# Patient Record
Sex: FEMALE | Race: WHITE | Employment: OTHER | URBAN - METROPOLITAN AREA
[De-identification: names, ages, dates, MRNs, and addresses within clinical notes are randomized per-mention and may not be internally consistent; named-entity substitution may affect disease eponyms.]

---

## 2023-11-12 ENCOUNTER — HOSPITAL ENCOUNTER (INPATIENT)
Age: 83
LOS: 4 days | Discharge: HOME | DRG: 512 | End: 2023-11-16
Attending: STUDENT IN AN ORGANIZED HEALTH CARE EDUCATION/TRAINING PROGRAM | Admitting: INTERNAL MEDICINE
Payer: MEDICARE

## 2023-11-12 ENCOUNTER — APPOINTMENT (OUTPATIENT)
Dept: GENERAL RADIOLOGY | Age: 83
DRG: 512 | End: 2023-11-12
Payer: MEDICARE

## 2023-11-12 ENCOUNTER — APPOINTMENT (OUTPATIENT)
Dept: CT IMAGING | Age: 83
DRG: 512 | End: 2023-11-12
Payer: MEDICARE

## 2023-11-12 DIAGNOSIS — S52.91XA CLOSED FRACTURE OF RIGHT RADIUS AND ULNA, INITIAL ENCOUNTER: Primary | ICD-10-CM

## 2023-11-12 DIAGNOSIS — S52.201A CLOSED FRACTURE OF RIGHT RADIUS AND ULNA, INITIAL ENCOUNTER: Primary | ICD-10-CM

## 2023-11-12 PROBLEM — S52.91XP: Status: ACTIVE | Noted: 2023-11-12

## 2023-11-12 PROBLEM — S52.201P: Status: ACTIVE | Noted: 2023-11-12

## 2023-11-12 LAB
ABO + RH BLD: NORMAL
ALBUMIN SERPL-MCNC: 4.4 G/DL (ref 3.5–5.2)
ALP SERPL-CCNC: 82 U/L (ref 35–104)
ALT SERPL-CCNC: 18 U/L (ref 0–32)
ANION GAP SERPL CALCULATED.3IONS-SCNC: 13 MMOL/L (ref 7–16)
ARM BAND NUMBER: NORMAL
AST SERPL-CCNC: 33 U/L (ref 0–31)
BILIRUB SERPL-MCNC: 0.4 MG/DL (ref 0–1.2)
BLOOD BANK SAMPLE EXPIRATION: NORMAL
BLOOD GROUP ANTIBODIES SERPL: NEGATIVE
BUN SERPL-MCNC: 13 MG/DL (ref 6–23)
CALCIUM SERPL-MCNC: 9.2 MG/DL (ref 8.6–10.2)
CHLORIDE SERPL-SCNC: 98 MMOL/L (ref 98–107)
CO2 SERPL-SCNC: 26 MMOL/L (ref 22–29)
CREAT SERPL-MCNC: 0.6 MG/DL (ref 0.5–1)
ERYTHROCYTE [DISTWIDTH] IN BLOOD BY AUTOMATED COUNT: 14.5 % (ref 11.5–15)
GFR SERPL CREATININE-BSD FRML MDRD: >60 ML/MIN/1.73M2
GLUCOSE SERPL-MCNC: 45 MG/DL (ref 74–99)
HCT VFR BLD AUTO: 37.2 % (ref 34–48)
HGB BLD-MCNC: 11.7 G/DL (ref 11.5–15.5)
MCH RBC QN AUTO: 27.3 PG (ref 26–35)
MCHC RBC AUTO-ENTMCNC: 31.5 G/DL (ref 32–34.5)
MCV RBC AUTO: 86.7 FL (ref 80–99.9)
PLATELET # BLD AUTO: 184 K/UL (ref 130–450)
PMV BLD AUTO: 10.6 FL (ref 7–12)
POTASSIUM SERPL-SCNC: 4.8 MMOL/L (ref 3.5–5)
PROT SERPL-MCNC: 7.4 G/DL (ref 6.4–8.3)
RBC # BLD AUTO: 4.29 M/UL (ref 3.5–5.5)
SODIUM SERPL-SCNC: 137 MMOL/L (ref 132–146)
WBC OTHER # BLD: 10.6 K/UL (ref 4.5–11.5)

## 2023-11-12 PROCEDURE — 1200000000 HC SEMI PRIVATE

## 2023-11-12 PROCEDURE — 6360000002 HC RX W HCPCS: Performed by: FAMILY MEDICINE

## 2023-11-12 PROCEDURE — 6370000000 HC RX 637 (ALT 250 FOR IP)

## 2023-11-12 PROCEDURE — 99222 1ST HOSP IP/OBS MODERATE 55: CPT | Performed by: ORTHOPAEDIC SURGERY

## 2023-11-12 PROCEDURE — 96374 THER/PROPH/DIAG INJ IV PUSH: CPT

## 2023-11-12 PROCEDURE — 6360000002 HC RX W HCPCS

## 2023-11-12 PROCEDURE — 86901 BLOOD TYPING SEROLOGIC RH(D): CPT

## 2023-11-12 PROCEDURE — 85027 COMPLETE CBC AUTOMATED: CPT

## 2023-11-12 PROCEDURE — 99285 EMERGENCY DEPT VISIT HI MDM: CPT

## 2023-11-12 PROCEDURE — 86900 BLOOD TYPING SEROLOGIC ABO: CPT

## 2023-11-12 PROCEDURE — 6360000002 HC RX W HCPCS: Performed by: INTERNAL MEDICINE

## 2023-11-12 PROCEDURE — 73090 X-RAY EXAM OF FOREARM: CPT

## 2023-11-12 PROCEDURE — 73200 CT UPPER EXTREMITY W/O DYE: CPT

## 2023-11-12 PROCEDURE — 94640 AIRWAY INHALATION TREATMENT: CPT

## 2023-11-12 PROCEDURE — 80053 COMPREHEN METABOLIC PANEL: CPT

## 2023-11-12 PROCEDURE — 70450 CT HEAD/BRAIN W/O DYE: CPT

## 2023-11-12 PROCEDURE — 73100 X-RAY EXAM OF WRIST: CPT

## 2023-11-12 PROCEDURE — 73070 X-RAY EXAM OF ELBOW: CPT

## 2023-11-12 PROCEDURE — 86850 RBC ANTIBODY SCREEN: CPT

## 2023-11-12 PROCEDURE — 36415 COLL VENOUS BLD VENIPUNCTURE: CPT

## 2023-11-12 PROCEDURE — 73110 X-RAY EXAM OF WRIST: CPT

## 2023-11-12 PROCEDURE — 2580000003 HC RX 258: Performed by: FAMILY MEDICINE

## 2023-11-12 PROCEDURE — 72125 CT NECK SPINE W/O DYE: CPT

## 2023-11-12 PROCEDURE — 96376 TX/PRO/DX INJ SAME DRUG ADON: CPT

## 2023-11-12 RX ORDER — ALLOPURINOL 100 MG/1
100 TABLET ORAL DAILY
COMMUNITY

## 2023-11-12 RX ORDER — ONDANSETRON 2 MG/ML
4 INJECTION INTRAMUSCULAR; INTRAVENOUS EVERY 6 HOURS PRN
Status: DISCONTINUED | OUTPATIENT
Start: 2023-11-12 | End: 2023-11-16 | Stop reason: HOSPADM

## 2023-11-12 RX ORDER — POTASSIUM CHLORIDE 7.45 MG/ML
10 INJECTION INTRAVENOUS PRN
Status: DISCONTINUED | OUTPATIENT
Start: 2023-11-12 | End: 2023-11-16 | Stop reason: HOSPADM

## 2023-11-12 RX ORDER — ONDANSETRON 4 MG/1
4 TABLET, ORALLY DISINTEGRATING ORAL EVERY 8 HOURS PRN
Status: DISCONTINUED | OUTPATIENT
Start: 2023-11-12 | End: 2023-11-16 | Stop reason: HOSPADM

## 2023-11-12 RX ORDER — FLUTICASONE PROPIONATE 110 UG/1
2 AEROSOL, METERED RESPIRATORY (INHALATION) 2 TIMES DAILY
Status: DISCONTINUED | OUTPATIENT
Start: 2023-11-12 | End: 2023-11-12 | Stop reason: CLARIF

## 2023-11-12 RX ORDER — OXYCODONE HYDROCHLORIDE 5 MG/1
5 TABLET ORAL EVERY 4 HOURS PRN
Status: DISCONTINUED | OUTPATIENT
Start: 2023-11-12 | End: 2023-11-16 | Stop reason: HOSPADM

## 2023-11-12 RX ORDER — ACETAMINOPHEN 325 MG/1
650 TABLET ORAL EVERY 6 HOURS PRN
Status: DISCONTINUED | OUTPATIENT
Start: 2023-11-12 | End: 2023-11-12 | Stop reason: ALTCHOICE

## 2023-11-12 RX ORDER — ACETAMINOPHEN 325 MG/1
650 TABLET ORAL EVERY 6 HOURS PRN
COMMUNITY

## 2023-11-12 RX ORDER — ASPIRIN 81 MG/1
81 TABLET ORAL DAILY
Status: ON HOLD | COMMUNITY
End: 2023-11-15 | Stop reason: HOSPADM

## 2023-11-12 RX ORDER — CITALOPRAM 20 MG/1
20 TABLET ORAL DAILY
Status: DISCONTINUED | OUTPATIENT
Start: 2023-11-12 | End: 2023-11-16 | Stop reason: HOSPADM

## 2023-11-12 RX ORDER — FLUTICASONE PROPIONATE 110 UG/1
2 AEROSOL, METERED RESPIRATORY (INHALATION) 2 TIMES DAILY
COMMUNITY

## 2023-11-12 RX ORDER — ALBUTEROL SULFATE 90 UG/1
2 AEROSOL, METERED RESPIRATORY (INHALATION) EVERY 6 HOURS PRN
COMMUNITY

## 2023-11-12 RX ORDER — PANTOPRAZOLE SODIUM 40 MG/1
40 TABLET, DELAYED RELEASE ORAL DAILY
COMMUNITY

## 2023-11-12 RX ORDER — ALBUTEROL SULFATE 2.5 MG/3ML
2.5 SOLUTION RESPIRATORY (INHALATION) EVERY 6 HOURS PRN
Status: DISCONTINUED | OUTPATIENT
Start: 2023-11-12 | End: 2023-11-16 | Stop reason: HOSPADM

## 2023-11-12 RX ORDER — MORPHINE SULFATE 2 MG/ML
2 INJECTION, SOLUTION INTRAMUSCULAR; INTRAVENOUS EVERY 4 HOURS PRN
Status: DISCONTINUED | OUTPATIENT
Start: 2023-11-12 | End: 2023-11-16 | Stop reason: HOSPADM

## 2023-11-12 RX ORDER — ACETAMINOPHEN 325 MG/1
650 TABLET ORAL EVERY 4 HOURS PRN
Status: DISCONTINUED | OUTPATIENT
Start: 2023-11-12 | End: 2023-11-16 | Stop reason: SDUPTHER

## 2023-11-12 RX ORDER — FENTANYL CITRATE 50 UG/ML
50 INJECTION, SOLUTION INTRAMUSCULAR; INTRAVENOUS ONCE
Status: COMPLETED | OUTPATIENT
Start: 2023-11-12 | End: 2023-11-12

## 2023-11-12 RX ORDER — SODIUM CHLORIDE 9 MG/ML
INJECTION, SOLUTION INTRAVENOUS PRN
Status: DISCONTINUED | OUTPATIENT
Start: 2023-11-12 | End: 2023-11-16 | Stop reason: HOSPADM

## 2023-11-12 RX ORDER — PRAVASTATIN SODIUM 20 MG
40 TABLET ORAL DAILY
Status: DISCONTINUED | OUTPATIENT
Start: 2023-11-12 | End: 2023-11-16 | Stop reason: HOSPADM

## 2023-11-12 RX ORDER — SODIUM CHLORIDE 0.9 % (FLUSH) 0.9 %
5-40 SYRINGE (ML) INJECTION EVERY 12 HOURS SCHEDULED
Status: DISCONTINUED | OUTPATIENT
Start: 2023-11-12 | End: 2023-11-16 | Stop reason: HOSPADM

## 2023-11-12 RX ORDER — POTASSIUM CHLORIDE 20 MEQ/1
40 TABLET, EXTENDED RELEASE ORAL PRN
Status: DISCONTINUED | OUTPATIENT
Start: 2023-11-12 | End: 2023-11-16 | Stop reason: HOSPADM

## 2023-11-12 RX ORDER — ACETAMINOPHEN 325 MG/1
650 TABLET ORAL EVERY 6 HOURS PRN
Status: DISCONTINUED | OUTPATIENT
Start: 2023-11-12 | End: 2023-11-16 | Stop reason: HOSPADM

## 2023-11-12 RX ORDER — PRAVASTATIN SODIUM 40 MG
40 TABLET ORAL DAILY
COMMUNITY

## 2023-11-12 RX ORDER — METOPROLOL TARTRATE 50 MG/1
25 TABLET, FILM COATED ORAL 2 TIMES DAILY
COMMUNITY

## 2023-11-12 RX ORDER — ALBUTEROL SULFATE 90 UG/1
2 AEROSOL, METERED RESPIRATORY (INHALATION) EVERY 6 HOURS PRN
Status: DISCONTINUED | OUTPATIENT
Start: 2023-11-12 | End: 2023-11-12 | Stop reason: CLARIF

## 2023-11-12 RX ORDER — POLYETHYLENE GLYCOL 3350 17 G/17G
17 POWDER, FOR SOLUTION ORAL DAILY PRN
Status: DISCONTINUED | OUTPATIENT
Start: 2023-11-12 | End: 2023-11-16 | Stop reason: HOSPADM

## 2023-11-12 RX ORDER — SODIUM CHLORIDE 0.9 % (FLUSH) 0.9 %
10 SYRINGE (ML) INJECTION PRN
Status: DISCONTINUED | OUTPATIENT
Start: 2023-11-12 | End: 2023-11-16 | Stop reason: HOSPADM

## 2023-11-12 RX ORDER — ASPIRIN 81 MG/1
81 TABLET ORAL DAILY
Status: DISCONTINUED | OUTPATIENT
Start: 2023-11-12 | End: 2023-11-15 | Stop reason: ALTCHOICE

## 2023-11-12 RX ORDER — BUDESONIDE 0.5 MG/2ML
0.5 INHALANT ORAL
Status: DISCONTINUED | OUTPATIENT
Start: 2023-11-12 | End: 2023-11-16 | Stop reason: HOSPADM

## 2023-11-12 RX ORDER — OXYCODONE HYDROCHLORIDE 10 MG/1
10 TABLET ORAL EVERY 4 HOURS PRN
Status: DISCONTINUED | OUTPATIENT
Start: 2023-11-12 | End: 2023-11-16 | Stop reason: HOSPADM

## 2023-11-12 RX ORDER — CITALOPRAM 20 MG/1
20 TABLET ORAL DAILY
COMMUNITY

## 2023-11-12 RX ORDER — ACETAMINOPHEN 650 MG/1
650 SUPPOSITORY RECTAL EVERY 6 HOURS PRN
Status: DISCONTINUED | OUTPATIENT
Start: 2023-11-12 | End: 2023-11-16 | Stop reason: HOSPADM

## 2023-11-12 RX ORDER — ALLOPURINOL 100 MG/1
100 TABLET ORAL DAILY
Status: DISCONTINUED | OUTPATIENT
Start: 2023-11-12 | End: 2023-11-16 | Stop reason: HOSPADM

## 2023-11-12 RX ORDER — PANTOPRAZOLE SODIUM 40 MG/1
40 TABLET, DELAYED RELEASE ORAL DAILY
Status: DISCONTINUED | OUTPATIENT
Start: 2023-11-12 | End: 2023-11-16 | Stop reason: HOSPADM

## 2023-11-12 RX ORDER — MAGNESIUM SULFATE IN WATER 40 MG/ML
2000 INJECTION, SOLUTION INTRAVENOUS PRN
Status: DISCONTINUED | OUTPATIENT
Start: 2023-11-12 | End: 2023-11-16 | Stop reason: HOSPADM

## 2023-11-12 RX ORDER — SODIUM CHLORIDE 9 MG/ML
INJECTION, SOLUTION INTRAVENOUS CONTINUOUS
Status: DISCONTINUED | OUTPATIENT
Start: 2023-11-12 | End: 2023-11-16

## 2023-11-12 RX ADMIN — BUDESONIDE 500 MCG: 0.5 INHALANT RESPIRATORY (INHALATION) at 21:30

## 2023-11-12 RX ADMIN — MORPHINE SULFATE 2 MG: 2 INJECTION, SOLUTION INTRAMUSCULAR; INTRAVENOUS at 21:24

## 2023-11-12 RX ADMIN — FENTANYL CITRATE 50 MCG: 50 INJECTION INTRAMUSCULAR; INTRAVENOUS at 16:22

## 2023-11-12 RX ADMIN — Medication 10 ML: at 21:24

## 2023-11-12 RX ADMIN — OXYCODONE HYDROCHLORIDE 10 MG: 10 TABLET ORAL at 23:26

## 2023-11-12 RX ADMIN — FENTANYL CITRATE 50 MCG: 50 INJECTION INTRAMUSCULAR; INTRAVENOUS at 14:12

## 2023-11-12 ASSESSMENT — LIFESTYLE VARIABLES
HOW OFTEN DO YOU HAVE A DRINK CONTAINING ALCOHOL: NEVER
HOW MANY STANDARD DRINKS CONTAINING ALCOHOL DO YOU HAVE ON A TYPICAL DAY: PATIENT DOES NOT DRINK
HOW OFTEN DO YOU HAVE A DRINK CONTAINING ALCOHOL: NEVER

## 2023-11-12 ASSESSMENT — PAIN DESCRIPTION - DESCRIPTORS
DESCRIPTORS: ACHING

## 2023-11-12 ASSESSMENT — PAIN SCALES - GENERAL
PAINLEVEL_OUTOF10: 8
PAINLEVEL_OUTOF10: 4
PAINLEVEL_OUTOF10: 9
PAINLEVEL_OUTOF10: 10
PAINLEVEL_OUTOF10: 5
PAINLEVEL_OUTOF10: 7

## 2023-11-12 ASSESSMENT — PAIN DESCRIPTION - ORIENTATION
ORIENTATION: RIGHT

## 2023-11-12 ASSESSMENT — PAIN DESCRIPTION - LOCATION
LOCATION: ARM

## 2023-11-12 ASSESSMENT — PAIN DESCRIPTION - PAIN TYPE
TYPE: ACUTE PAIN
TYPE: ACUTE PAIN

## 2023-11-12 ASSESSMENT — PAIN DESCRIPTION - ONSET
ONSET: ON-GOING
ONSET: ON-GOING

## 2023-11-12 ASSESSMENT — PAIN DESCRIPTION - FREQUENCY
FREQUENCY: CONTINUOUS
FREQUENCY: CONTINUOUS

## 2023-11-12 ASSESSMENT — PAIN - FUNCTIONAL ASSESSMENT
PAIN_FUNCTIONAL_ASSESSMENT: 0-10
PAIN_FUNCTIONAL_ASSESSMENT: 0-10

## 2023-11-12 NOTE — CONSULTS
Department of Orthopedic Surgery  Resident Consult Note          Reason for Consult:   Right Wrist Pain    HISTORY OF PRESENT ILLNESS:       Patient is a 80 y.o. female who presents with wrist pain after fall. Patient report a fall while walking to her car from [employer]. No head trauma or LOC. Cause of a fall: trip over foot. Patient is right hand dominant. Anticoagulation - none. The patient is currently unemployed. The patient is community Ambulator with assist  - walker. Patient states that she was just recently prescribed a walker a few months ago after a fall from standing height resulting in spraining both of her ankles. Pt lives at home . Patient has a significant history of valvular heart disease, HL, CHF and HTN. ORIF R tibia performed over 2 decades ago. Previous Orthopedic Surgeon - no Denies numbness/tingling/paresthesias. Denies any other orthopedic complaints at this time. Tobacco use: denies  Alcohol use: none  Illicit drug use: no history of illicit drug use    Past Medical History:    No past medical history on file. Past Surgical History:    No past surgical history on file. Current Medications:   No current facility-administered medications for this encounter. Allergies:  Patient has no allergy information on record. Social History:   TOBACCO:   has no history on file for tobacco use. ETOH:   has no history on file for alcohol use. DRUGS:   has no history on file for drug use. ACTIVITIES OF DAILY LIVING:    OCCUPATION:    Family History:   No family history on file.     REVIEW OF SYSTEMS:  CONSTITUTIONAL:  negative for  fevers, chills  EYES:  negative for blurred vision, visual disturbance  HEENT:  negative for  hearing loss, voice change  RESPIRATORY:  negative for  dyspnea, wheezing  CARDIOVASCULAR:  negative for  chest pain, palpitations  GASTROINTESTINAL:  negative for nausea, vomiting  GENITOURINARY:  negative for frequency, urinary incontinence  HEMATOLOGIC/LYMPHATIC:  negative the neck. Segmented fracture of the proximal one third ulna with no extension into the ulnohumeral joint. Significant comminution about the volar aspect of the distal radius with shearing component and impaction. Separate dorsal ulnar fracture component. IMPRESSION:  Closed, Right intraarticular Distal Radius Fx  Closed, Right proximal 1/3rd ulnar shaft fx   Closed, Right radial head and neck fracture    Discussion:  Risk, benefits and treatment options were discussed and has verbalized understanding of options. The possibility of complications were also discussed to include but not limited to nerve damage, infection, problems with wound healing, vascular injury, chronic pain, stiffness, dysfunction, nonhealing of the bone, symptomatic hardware and/or its failure, need for subsequent surgery, dislocation, and blood clots as well as medical related problems and other problems not specifically discussed. Risk of anesthesia also discussed to include death. After all questions and concerns were address, she agreed to proceed with the procedure. PLAN:  After informed consent was verbally obtained. Distal radius underwent closed reduction with application of well-padded sugar tong splint, neurovascular status was unchanged  Dedicated elbow pre-reduction radiographs ordered. Post reduction films as well as post reduction both bone CT scan  Non-weight bearing to Right Upper Extermity. Maintain sling when upright or while ambulating. Pain medication: Multimodal  Ice and elevation to  rightUE  Hold anticoagulation prior to surgery. This will be held midnight on the day of surgery. Preoperative imaging and orders/labs have been made. Treatment consent  Plan for operative fixation pending on OR availability. Discussed with Dr. Natacha Ward Attending    I have seen and evaluated the patient and agree with the above assessment.  I have performed the key components of the history and

## 2023-11-12 NOTE — ED PROVIDER NOTES
Department of Emergency Medicine     Written by: Sylvia Morrissey DO  Patient Name: [facility] Date: 2023  1:11 PM  MRN: [id number]                   : 1940      HPI  Chief Complaint   Patient presents with    Fall     Fall with right wrist deformity. Denies hitting head/denies LOC     This is an 57-year-old female with no relevant past medical history on file who presents to the emergency complaining of right arm pain. She states that she was at [employer] and was walking out to her car to put something in the car when she lost her balance and fell. She landed on her right arm. Denies hitting her head and denies being on any blood thinners. She endorses pain to the right wrist and arm. Patient states she has seen orthopedic surgeon in the past however was about 20 years ago and in [place]. Patient denies any lightheadedness or dizziness, fever or chills, nausea or vomiting, chest pain, shortness of breath, abdominal pain, hematuria or dysuria, constipation or diarrhea. Nursing notes were all reviewed and agreed with or any disagreements were addressed in the HPI. Review of systems:    Pertinent positives and negatives mentioned in the HPI/MDM. Physical Exam  Constitutional:       General: She is not in acute distress. HENT:      Head: Normocephalic and atraumatic. Eyes:      Extraocular Movements: Extraocular movements intact. Pupils: Pupils are equal, round, and reactive to light. Cardiovascular:      Rate and Rhythm: Normal rate and regular rhythm. Pulmonary:      Effort: Pulmonary effort is normal.      Breath sounds: Normal breath sounds. No stridor. No wheezing, rhonchi or rales. Abdominal:      General: Abdomen is flat. There is no distension. Palpations: Abdomen is soft. Tenderness: There is no guarding. Musculoskeletal:         General: Signs of injury present. No deformity. Normal range of motion. Cervical back: Normal range of motion.
Name band;

## 2023-11-13 ENCOUNTER — APPOINTMENT (OUTPATIENT)
Dept: GENERAL RADIOLOGY | Age: 83
DRG: 512 | End: 2023-11-13
Payer: MEDICARE

## 2023-11-13 PROCEDURE — 6370000000 HC RX 637 (ALT 250 FOR IP): Performed by: FAMILY MEDICINE

## 2023-11-13 PROCEDURE — 6370000000 HC RX 637 (ALT 250 FOR IP)

## 2023-11-13 PROCEDURE — 97165 OT EVAL LOW COMPLEX 30 MIN: CPT

## 2023-11-13 PROCEDURE — 97161 PT EVAL LOW COMPLEX 20 MIN: CPT

## 2023-11-13 PROCEDURE — 94640 AIRWAY INHALATION TREATMENT: CPT

## 2023-11-13 PROCEDURE — 71045 X-RAY EXAM CHEST 1 VIEW: CPT

## 2023-11-13 PROCEDURE — 2700000000 HC OXYGEN THERAPY PER DAY

## 2023-11-13 PROCEDURE — 6360000002 HC RX W HCPCS: Performed by: FAMILY MEDICINE

## 2023-11-13 PROCEDURE — 1200000000 HC SEMI PRIVATE

## 2023-11-13 PROCEDURE — 97530 THERAPEUTIC ACTIVITIES: CPT

## 2023-11-13 PROCEDURE — 93005 ELECTROCARDIOGRAM TRACING: CPT | Performed by: STUDENT IN AN ORGANIZED HEALTH CARE EDUCATION/TRAINING PROGRAM

## 2023-11-13 PROCEDURE — 94669 MECHANICAL CHEST WALL OSCILL: CPT

## 2023-11-13 PROCEDURE — 6360000002 HC RX W HCPCS: Performed by: INTERNAL MEDICINE

## 2023-11-13 PROCEDURE — 2580000003 HC RX 258: Performed by: FAMILY MEDICINE

## 2023-11-13 RX ADMIN — ALBUTEROL SULFATE 2.5 MG: 2.5 SOLUTION RESPIRATORY (INHALATION) at 21:26

## 2023-11-13 RX ADMIN — MORPHINE SULFATE 2 MG: 2 INJECTION, SOLUTION INTRAMUSCULAR; INTRAVENOUS at 02:25

## 2023-11-13 RX ADMIN — BUDESONIDE 500 MCG: 0.5 INHALANT RESPIRATORY (INHALATION) at 09:13

## 2023-11-13 RX ADMIN — ALLOPURINOL 100 MG: 100 TABLET ORAL at 08:20

## 2023-11-13 RX ADMIN — PANTOPRAZOLE SODIUM 40 MG: 40 TABLET, DELAYED RELEASE ORAL at 08:20

## 2023-11-13 RX ADMIN — CITALOPRAM HYDROBROMIDE 20 MG: 20 TABLET ORAL at 08:20

## 2023-11-13 RX ADMIN — Medication 10 ML: at 19:46

## 2023-11-13 RX ADMIN — ALBUTEROL SULFATE 2.5 MG: 2.5 SOLUTION RESPIRATORY (INHALATION) at 09:13

## 2023-11-13 RX ADMIN — MORPHINE SULFATE 2 MG: 2 INJECTION, SOLUTION INTRAMUSCULAR; INTRAVENOUS at 08:24

## 2023-11-13 RX ADMIN — MORPHINE SULFATE 2 MG: 2 INJECTION, SOLUTION INTRAMUSCULAR; INTRAVENOUS at 12:55

## 2023-11-13 RX ADMIN — OXYCODONE HYDROCHLORIDE 10 MG: 10 TABLET ORAL at 10:29

## 2023-11-13 RX ADMIN — PRAVASTATIN SODIUM 40 MG: 20 TABLET ORAL at 08:20

## 2023-11-13 RX ADMIN — MORPHINE SULFATE 2 MG: 2 INJECTION, SOLUTION INTRAMUSCULAR; INTRAVENOUS at 16:48

## 2023-11-13 RX ADMIN — BUDESONIDE 500 MCG: 0.5 INHALANT RESPIRATORY (INHALATION) at 21:26

## 2023-11-13 RX ADMIN — OXYCODONE HYDROCHLORIDE 10 MG: 10 TABLET ORAL at 19:10

## 2023-11-13 RX ADMIN — OXYCODONE HYDROCHLORIDE 10 MG: 10 TABLET ORAL at 05:18

## 2023-11-13 RX ADMIN — OXYCODONE HYDROCHLORIDE 10 MG: 10 TABLET ORAL at 14:41

## 2023-11-13 RX ADMIN — METOPROLOL TARTRATE 25 MG: 25 TABLET, FILM COATED ORAL at 19:46

## 2023-11-13 RX ADMIN — METOPROLOL TARTRATE 25 MG: 25 TABLET, FILM COATED ORAL at 08:20

## 2023-11-13 ASSESSMENT — PAIN SCALES - GENERAL
PAINLEVEL_OUTOF10: 9
PAINLEVEL_OUTOF10: 9
PAINLEVEL_OUTOF10: 10
PAINLEVEL_OUTOF10: 4
PAINLEVEL_OUTOF10: 9
PAINLEVEL_OUTOF10: 10
PAINLEVEL_OUTOF10: 9
PAINLEVEL_OUTOF10: 9
PAINLEVEL_OUTOF10: 3
PAINLEVEL_OUTOF10: 4
PAINLEVEL_OUTOF10: 8

## 2023-11-13 ASSESSMENT — PAIN DESCRIPTION - LOCATION
LOCATION: HAND
LOCATION: ARM
LOCATION: HAND
LOCATION: ARM
LOCATION: WRIST
LOCATION: ARM
LOCATION: ARM

## 2023-11-13 ASSESSMENT — PAIN DESCRIPTION - ORIENTATION
ORIENTATION: RIGHT

## 2023-11-13 ASSESSMENT — PAIN DESCRIPTION - DESCRIPTORS
DESCRIPTORS: DISCOMFORT;THROBBING;TENDER
DESCRIPTORS: ACHING;DISCOMFORT;SHARP;THROBBING
DESCRIPTORS: ACHING;DISCOMFORT;SHARP;THROBBING
DESCRIPTORS: ACHING
DESCRIPTORS: ACHING
DESCRIPTORS: DISCOMFORT;THROBBING;TIGHTNESS
DESCRIPTORS: ACHING;DISCOMFORT;THROBBING

## 2023-11-13 ASSESSMENT — PAIN DESCRIPTION - ONSET
ONSET: ON-GOING

## 2023-11-13 ASSESSMENT — PAIN - FUNCTIONAL ASSESSMENT
PAIN_FUNCTIONAL_ASSESSMENT: ACTIVITIES ARE NOT PREVENTED
PAIN_FUNCTIONAL_ASSESSMENT: ACTIVITIES ARE NOT PREVENTED

## 2023-11-13 ASSESSMENT — PAIN DESCRIPTION - PAIN TYPE
TYPE: ACUTE PAIN

## 2023-11-13 ASSESSMENT — PAIN DESCRIPTION - FREQUENCY
FREQUENCY: CONTINUOUS

## 2023-11-13 ASSESSMENT — PAIN SCALES - WONG BAKER: WONGBAKER_NUMERICALRESPONSE: 0

## 2023-11-13 NOTE — H&P
[facility]  History and Physical      CHIEF COMPLAINT:    Chief Complaint   Patient presents with    Fall     Fall with right wrist deformity. Denies hitting head/denies LOC        Patient of Ginny Petersen., DO presents with:  Radius/ulna fracture, right, closed, with malunion, subsequent encounter    History of Present Illness:   Patient is an 22-year-old female without a past medical history on file. Patient presented to the ED with complaints of pain in her right wrist after a mechanical fall. Patient states that she was at [employer] and was walking out to her car to put something in the car she lost her balance and fell. Patient landed on her right arm. Patient denies hitting her head and denies being on any anticoagulation. Patient endorses pain to the right wrist DNR. Patient states she has seen an orthopedic surgeon in the past however that was about 20 years ago and it was in another city. Patient states she tripped she did not lose consciousness or have a syncopal episode. ER work-up revealed fracture of the distal right radius and comminuted fracture of the proximal.  Orthopedic surgery was consulted wrist was reduced in the ED and patient is admitted to [address] unit for further treatment. On evaluation she complains of severe pain in her right arm, starting to work with physical therapy. She has received pain medication without relief. She denies any syncopal episode to sustaining her fall. REVIEW OF SYSTEMS:  Pertinent negatives are above in HPI. 10 point ROS otherwise negative. History reviewed. No pertinent past medical history. History reviewed. No pertinent surgical history.     Medications Prior to Admission:    Medications Prior to Admission: allopurinol (ZYLOPRIM) 100 MG tablet, Take 1 tablet by mouth daily  albuterol sulfate HFA (VENTOLIN HFA) 108 (90 Base) MCG/ACT inhaler, Inhale 2 puffs into the lungs every 6 hours as needed for 11/12/2023 08:42 PM    MCV 86.7 11/12/2023 08:42 PM    MCH 27.3 11/12/2023 08:42 PM    MCHC 31.5 11/12/2023 08:42 PM    RDW 14.5 11/12/2023 08:42 PM     CMP:    Lab Results   Component Value Date/Time     11/12/2023 07:19 PM    K 4.8 11/12/2023 07:19 PM    CL 98 11/12/2023 07:19 PM    CO2 26 11/12/2023 07:19 PM    BUN 13 11/12/2023 07:19 PM    CREATININE 0.6 11/12/2023 07:19 PM    LABGLOM >60 11/12/2023 07:19 PM    GLUCOSE 45 11/12/2023 07:19 PM    PROT 7.4 11/12/2023 07:19 PM    LABALBU 4.4 11/12/2023 07:19 PM    CALCIUM 9.2 11/12/2023 07:19 PM    BILITOT 0.4 11/12/2023 07:19 PM    ALKPHOS 82 11/12/2023 07:19 PM    AST 33 11/12/2023 07:19 PM    ALT 18 11/12/2023 07:19 PM       Imaging:  X-ray right ulna: Acute comminuted intra-articular displaced fracture of the distal right radius. Acute comminuted displaced fracture of the proximal right ulnar shaft. Impacted fracture of the base of the right radial head. X-ray right elbow: comminuted fracture of the proximal ulna. Mildly displaced fracture of the radial neck. X-ray cervical spine: No acute abnormality of the cervical spine. CXR: Cardiac size borderline enlarged with overt edema or effusion. No focal consolidation. EKG:  I've personally reviewed the patient's EKG:  NSR    Telemetry:  I've personally reviewed the patient's telemetry:      ASSESSMENT/PLAN:  Principal Problem:    Radius/ulna fracture, right, closed, with malunion, subsequent encounter  Resolved Problems:    * No resolved hospital problems.  *    51-year-old female without a past medical history presents to the ED with complaints of right arm pain right arm pain after mechanical fall is admitted to [facility] unit with    Radius/ulnar fracture  Pain management  Elevate and ice affected limb  Consult general surgery-surgical procedure on Wednesday  Strict I's and O's  Bowel regimen  Stable to proceed from medicine standpoint for surgery low to moderate risk given advanced age  Blood

## 2023-11-13 NOTE — PROGRESS NOTES
Physical Therapy  Initial Assessment       Name: Kaity Lozoya  : 1940  MRN: [id number]      Date of Service: 2023    Evaluating PT:  Michael Hernandez PT, DPT  [id number]    Room #:  7734/4775-H  Diagnosis:  Closed fracture of right radius and ulna, initial encounter [S52.91XA, S52.201A]  Radius/ulna fracture, right, closed, with malunion, subsequent encounter [S52.91XP, S52.201P]  PMHx/PSHx:   has no past medical history on file. Procedure/Surgery:    Precautions:  NWB RUE, Falls, 3 L O2  Equipment Needs:  TBD    SUBJECTIVE:    Pt lives alone in a 1 story home with 5 stairs to enter and single rail. Bed is on first floor and bath is on first floor. Pt ambulated with no AD independently PTA. Equipment Owned:   [employer]   W/c    OBJECTIVE:   Initial Evaluation  Date: 23 Treatment Short Term/ Long Term   Goals   AM-PAC 6 Clicks 41/92     Was pt agreeable to Eval/treatment? yes     Does pt have pain? 9/10 R wrist     Bed Mobility  Rolling: NT  Supine to sit: ModA  Sit to supine: NT  Scooting: ModA  Rolling: Independent  Supine to sit: Independent  Sit to supine: Independent  Scooting: Independent     Transfers Sit to stand: ModA  Stand to sit: ModA  Stand pivot: ModA HHA  Sit to stand: Modified Independent    Stand to sit: Modified Independent    Stand pivot: Modified Independent     Ambulation    45 feet with ModA HHA   300 feet with Modified Independent   AD   Stair negotiation: ascended and descended  NT  4 steps with single rail Modified Independent     ROM BUE:  Defer to OT  BLE:  WFl     Strength BUE:  Defer to OT  BLE:  4/5  Improve 1 MMT   Balance Sitting EOB:  SBA  Dynamic Standing:  ModA HHA  Sitting EOB:  Independent    Dynamic Standing:  Modified Independent       Pt is A & O x 4  Sensation:  WNL  Edema:   WNL    Vitals:    HR 74  Spo2 95% RA  PRE  HR 80  Spo2 86% RA   ACTIVITY  /52 seated post ambulation    3 L O2 placed back on patient spo2 recovered to 95%      Therapeutic Exercises:

## 2023-11-13 NOTE — CARE COORDINATION
11/13. Met with the pt, her daughter Van and her cousin, at the bedside to discuss transition of care. The pt lives alone but has children who can assist her. Van can have her stay at her home. Surgery cancelled for today. Rescheduled for Wednesday. The pt does not use a walker, but if she needs an assistive device, she would like to receive it from [facility]. Her pcp is Dr Shaun Childs. She will return home with daughter when medically stable.  Michaelle Dominguez RN

## 2023-11-13 NOTE — PROGRESS NOTES
Interval orthopedic progress note    Surgery for today have been cancel and will be rescheduled for Wednesday, November 15. Ok to resume diet. Please hold anticoagulation midnight prior to exam as well as diet.

## 2023-11-13 NOTE — PROGRESS NOTES
Occupational Therapy          OCCUPATIONAL THERAPY INITIAL EVALUATION    [facility] [address] [facility]   [address] [facility] [place], [address] [facility], [place]      Date:2023                 Patient Name: Sukhdev Zafar  MRN: [id number]  : 1940  Room: [address] [place] [address]-    Referring Provider: LOLITA Barrientos CNP  Specific Provider Orders/Date: OT evaluation and treat 23    Evaluating OT: Julio Swann, OTR/L #[id number]    Diagnosis: Closed fracture of right radius and ulna, initial encounter [S52.91XA, S52.201A]  Radius/ulna fracture, right, closed, with malunion, subsequent encounter [S52.91XP, S52.201P]      Surgery: scheduled for  operative fixation for R UE on Wednesday 11/15    Pertinent Medical History:  has no past medical history on file.      Precautions:  Fall Risk, NWB to RUE - keep elevated and iced    Assessment of current deficits   [x] Functional mobility  [x]ADLs  [] Strength               []Cognition   [x] Functional transfers   [x] IADLs         [x] Safety Awareness   [x]Endurance   [] Fine Coordination              [x] Balance      [] Vision/perception   [x]Sensation    []Gross Motor Coordination  [x] ROM  [] Delirium                   [] Motor Control     OT PLAN OF CARE   OT POC based on physician orders, patient diagnosis and results of clinical assessment    Frequency/Duration   2-4 days/wk for 1 week PRN   Specific OT Treatment Interventions to include:   * Instruction/training on adapted ADL techniques and AE recommendations to increase functional independence within precautions       * Training on energy conservation strategies, correct breathing pattern and techniques to improve independence/tolerance for self-care routine  * Functional transfer/mobility training/DME recommendations for increased independence, safety, and fall prevention  * Patient/Family education to increase follow through with safety techniques and functional independence  * HR during session  Sitting/standing Balance/Tolerance- to increased balance & activity tolerance during ADLs as well as facilitate proper posture and/or positioning. Therapeutic exercise- Instruction on R UE ROM exercises as indicated post op to improve strength/function for increased [place] with ADLs & iADLs    Rehab Potential: Good  for established goals     Patient / Family Goal: decrease pain      Patient and/or family were instructed on functional diagnosis, prognosis/goals and OT plan of care. Demonstrated good understanding. Eval Complexity: low    Time In: 10:20  Time Out: 10:45  Total Treatment Time: 10 min. Min Units   OT Eval Low 49754  X     OT Eval Medium 21850      OT Eval High [id number]       OT Re-Eval [id number]       Therapeutic Ex [id number]       Therapeutic Activities 15981  10  1   ADL/Self Care 27735       Orthotic Management 61542       Neuro Re-Ed 97444       Non-Billable Time          Evaluation Time includes thorough review of current medical information, gathering information on past medical history/social history and prior level of function, completion of standardized testing/informal observation of tasks, assessment of data and education on plan of care and goals. Katrin Garcia.  [address], [address]

## 2023-11-14 PROCEDURE — 6370000000 HC RX 637 (ALT 250 FOR IP): Performed by: FAMILY MEDICINE

## 2023-11-14 PROCEDURE — 2580000003 HC RX 258: Performed by: FAMILY MEDICINE

## 2023-11-14 PROCEDURE — 97530 THERAPEUTIC ACTIVITIES: CPT

## 2023-11-14 PROCEDURE — 2700000000 HC OXYGEN THERAPY PER DAY

## 2023-11-14 PROCEDURE — 97535 SELF CARE MNGMENT TRAINING: CPT

## 2023-11-14 PROCEDURE — 94640 AIRWAY INHALATION TREATMENT: CPT

## 2023-11-14 PROCEDURE — 1200000000 HC SEMI PRIVATE

## 2023-11-14 PROCEDURE — 6360000002 HC RX W HCPCS: Performed by: FAMILY MEDICINE

## 2023-11-14 PROCEDURE — 6360000002 HC RX W HCPCS: Performed by: INTERNAL MEDICINE

## 2023-11-14 PROCEDURE — 6370000000 HC RX 637 (ALT 250 FOR IP)

## 2023-11-14 RX ADMIN — MORPHINE SULFATE 2 MG: 2 INJECTION, SOLUTION INTRAMUSCULAR; INTRAVENOUS at 10:17

## 2023-11-14 RX ADMIN — OXYCODONE HYDROCHLORIDE 10 MG: 10 TABLET ORAL at 07:49

## 2023-11-14 RX ADMIN — MORPHINE SULFATE 2 MG: 2 INJECTION, SOLUTION INTRAMUSCULAR; INTRAVENOUS at 18:47

## 2023-11-14 RX ADMIN — BUDESONIDE 500 MCG: 0.5 INHALANT RESPIRATORY (INHALATION) at 18:31

## 2023-11-14 RX ADMIN — SODIUM CHLORIDE: 9 INJECTION, SOLUTION INTRAVENOUS at 06:03

## 2023-11-14 RX ADMIN — MORPHINE SULFATE 2 MG: 2 INJECTION, SOLUTION INTRAMUSCULAR; INTRAVENOUS at 14:29

## 2023-11-14 RX ADMIN — OXYCODONE HYDROCHLORIDE 10 MG: 10 TABLET ORAL at 03:39

## 2023-11-14 RX ADMIN — Medication 10 ML: at 20:36

## 2023-11-14 RX ADMIN — OXYCODONE HYDROCHLORIDE 10 MG: 10 TABLET ORAL at 12:11

## 2023-11-14 RX ADMIN — METOPROLOL TARTRATE 25 MG: 25 TABLET, FILM COATED ORAL at 20:36

## 2023-11-14 RX ADMIN — SODIUM CHLORIDE, PRESERVATIVE FREE 10 ML: 5 INJECTION INTRAVENOUS at 14:29

## 2023-11-14 RX ADMIN — BUDESONIDE 500 MCG: 0.5 INHALANT RESPIRATORY (INHALATION) at 10:30

## 2023-11-14 RX ADMIN — PRAVASTATIN SODIUM 40 MG: 20 TABLET ORAL at 10:17

## 2023-11-14 RX ADMIN — ALLOPURINOL 100 MG: 100 TABLET ORAL at 10:17

## 2023-11-14 RX ADMIN — METOPROLOL TARTRATE 25 MG: 25 TABLET, FILM COATED ORAL at 10:17

## 2023-11-14 RX ADMIN — OXYCODONE HYDROCHLORIDE 10 MG: 10 TABLET ORAL at 20:34

## 2023-11-14 RX ADMIN — PANTOPRAZOLE SODIUM 40 MG: 40 TABLET, DELAYED RELEASE ORAL at 07:49

## 2023-11-14 RX ADMIN — OXYCODONE HYDROCHLORIDE 10 MG: 10 TABLET ORAL at 16:19

## 2023-11-14 RX ADMIN — MORPHINE SULFATE 2 MG: 2 INJECTION, SOLUTION INTRAMUSCULAR; INTRAVENOUS at 06:00

## 2023-11-14 RX ADMIN — CITALOPRAM HYDROBROMIDE 20 MG: 20 TABLET ORAL at 10:29

## 2023-11-14 ASSESSMENT — PAIN SCALES - GENERAL
PAINLEVEL_OUTOF10: 8
PAINLEVEL_OUTOF10: 5
PAINLEVEL_OUTOF10: 6
PAINLEVEL_OUTOF10: 10
PAINLEVEL_OUTOF10: 6
PAINLEVEL_OUTOF10: 8
PAINLEVEL_OUTOF10: 5
PAINLEVEL_OUTOF10: 10
PAINLEVEL_OUTOF10: 5
PAINLEVEL_OUTOF10: 4
PAINLEVEL_OUTOF10: 8
PAINLEVEL_OUTOF10: 10
PAINLEVEL_OUTOF10: 8
PAINLEVEL_OUTOF10: 4
PAINLEVEL_OUTOF10: 5

## 2023-11-14 ASSESSMENT — PAIN DESCRIPTION - ONSET
ONSET: ON-GOING
ONSET: PROGRESSIVE

## 2023-11-14 ASSESSMENT — PAIN - FUNCTIONAL ASSESSMENT
PAIN_FUNCTIONAL_ASSESSMENT: PREVENTS OR INTERFERES SOME ACTIVE ACTIVITIES AND ADLS

## 2023-11-14 ASSESSMENT — PAIN DESCRIPTION - FREQUENCY
FREQUENCY: INTERMITTENT
FREQUENCY: CONTINUOUS
FREQUENCY: INTERMITTENT
FREQUENCY: CONTINUOUS

## 2023-11-14 ASSESSMENT — PAIN DESCRIPTION - PAIN TYPE
TYPE: ACUTE PAIN

## 2023-11-14 ASSESSMENT — PAIN DESCRIPTION - DESCRIPTORS
DESCRIPTORS: ACHING;DISCOMFORT;SORE
DESCRIPTORS: ACHING;DISCOMFORT;TENDER;SHARP
DESCRIPTORS: ACHING;DISCOMFORT;SHARP
DESCRIPTORS: ACHING;DISCOMFORT;SORE
DESCRIPTORS: ACHING;CRUSHING;SORE
DESCRIPTORS: ACHING;DISCOMFORT;SHOOTING
DESCRIPTORS: ACHING;DISCOMFORT;SHARP
DESCRIPTORS: ACHING;DISCOMFORT;DULL
DESCRIPTORS: ACHING;DISCOMFORT;TENDER

## 2023-11-14 ASSESSMENT — PAIN DESCRIPTION - LOCATION
LOCATION: ARM

## 2023-11-14 ASSESSMENT — PAIN DESCRIPTION - ORIENTATION
ORIENTATION: RIGHT

## 2023-11-14 NOTE — PROGRESS NOTES
Physical Therapy  Treatment Note       Name: Jourdan Zarate  : 1940  MRN: [id number]      Date of Service: 2023    Evaluating PT:  Chioma Manyard PT, DPT  [id number]    Room #:  9491/8184-Y  Diagnosis:  Closed fracture of right radius and ulna, initial encounter [S52.91XA, S52.201A]  Radius/ulna fracture, right, closed, with malunion, subsequent encounter [S52.91XP, S52.201P]  PMHx/PSHx:   has no past medical history on file. Procedure/Surgery:    Precautions:  NWB RUE, Falls,  ( 23)   Equipment Needs:  TBD    SUBJECTIVE:    Pt lives alone in a 1 story home with 5 stairs to enter and single rail. Bed is on first floor and bath is on first floor. Pt ambulated with no AD independently PTA. Equipment Owned:   [employer]   W/c    OBJECTIVE:   Initial Evaluation  Date: 23 Treatment  23 Short Term/ Long Term   Goals   AM-PAC 6 Clicks 59/20     Was pt agreeable to Eval/treatment? yes Yes     Does pt have pain? 9/10 R wrist R UE pain     Bed Mobility  Rolling: NT  Supine to sit: ModA  Sit to supine: NT  Scooting: ModA Supine to sit min A  Scooting min A Rolling: Independent  Supine to sit:  Independent  Sit to supine: Independent  Scooting: Independent     Transfers Sit to stand: ModA  Stand to sit: ModA  Stand pivot: ModA HHA Sit to stand min A  Stand to sit min A   Stand pivot without device with min A Sit to stand: Modified Independent    Stand to sit: Modified Independent    Stand pivot: Modified Independent     Ambulation    45 feet with ModA HHA  50 feet x2 without device with min A 300 feet with Modified Independent   AD   Stair negotiation: ascended and descended  NT NT 4 steps with single rail Modified Independent     ROM BUE:  Defer to OT  BLE:  WFl     Strength BUE:  Defer to OT  BLE:  4/5  Improve 1 MMT   Balance Sitting EOB:  SBA  Dynamic Standing:  ModA HHA  Sitting EOB:  Independent    Dynamic Standing:  Modified Independent       Pt is A & O x 4  Sensation:  WNL  Edema: WNL    Vitals:  Pt on room air with activity ( SOB noted )  86-90 % and 95 % with gait           Patient education  Pt educated on diaphragmatic breathing     Patient response to education:   Pt verbalized understanding Pt demonstrated skill Pt requires further education in this area   x X Verbal cues  x     ASSESSMENT:    Conditions Requiring Skilled Therapeutic Intervention:    [x]Decreased strength     [x]Decreased ROM  [x]Decreased functional mobility  [x]Decreased balance   [x]Decreased endurance   []Decreased posture  []Decreased sensation  []Decreased coordination   []Decreased vision  [x]Decreased safety awareness   [x]Increased pain       Comments:  Pt in bed upon arrival and agreed to participate in therapy. Pt reported R UE pain. Pt complete functional mobility as noted above. Sitting and standing balance with min A  due to inability to use R UE. Pt had no complaints of dizziness and fatigue during session. Pt still with some unsteadiness with gait. Decreased assistance required with all mobility this session. Patient would benefit from continued skilled PT to maximize functional mobility independence. Treatment:  Patient practiced and was instructed in the following treatment:    Bed mobility- verbal instruction  to facilitate independence. Assistance required to complete task. Functional transfers-Verbal instruction for technique to improve safety and balance. Assistance required to complete task. Gait training-Verbal instruction for slower aries to improve safety and balance. Assistance required to complete task. Pt's/ family goals   1. Get better    Prognosis is good for reaching above PT goals. Patient and or family understand(s) diagnosis, prognosis, and plan of care.   yes    PHYSICAL THERAPY PLAN OF CARE:    PT POC is established based on physician order and patient diagnosis     Referring provider/PT Order:    11/12/23 2045  PT evaluation and treat  Start:  11/12/23 2045,

## 2023-11-14 NOTE — CARE COORDINATION
11/14. For surgery tomorrow. The pt will go to the daughter's home when she is medically stable.  Freda Warren RN

## 2023-11-14 NOTE — PROGRESS NOTES
Occupational Therapy  OT BEDSIDE TREATMENT NOTE    [employer] [address] [place] [address]      ALV  Patient Name: Deann Moscoso  MRN: [id number]  : 1940  Room: [address] [place] [address]-     Referring Provider: LOLITA Cristina CNP  Specific Provider Orders/Date: OT evaluation and treat 23     Evaluating OT: Kirsten Swann, OTR/L #[id number]     Diagnosis: Closed fracture of right radius and ulna, initial encounter [S52.91XA, S52.201A]  Radius/ulna fracture, right, closed, with malunion, subsequent encounter [S52.91XP, S52.201P]       Surgery: scheduled for  operative fixation for R UE on Wednesday 11/15     Pertinent Medical History:  has no past medical history on file.       Precautions:  Fall Risk, NWB to RUE - keep elevated and iced     Assessment of current deficits   [x] Functional mobility             [x]ADLs           [] Strength                  []Cognition   [x] Functional transfers           [x] IADLs         [x] Safety Awareness   [x]Endurance   [] Fine Coordination              [x] Balance      [] Vision/perception   [x]Sensation     []Gross Motor Coordination  [x] ROM           [] Delirium                   [] Motor Control      OT PLAN OF CARE   OT POC based on physician orders, patient diagnosis and results of clinical assessment     Frequency/Duration   2-4 days/wk for 1 week PRN   Specific OT Treatment Interventions to include:   * Instruction/training on adapted ADL techniques and AE recommendations to increase functional independence within precautions       * Training on energy conservation strategies, correct breathing pattern and techniques to improve independence/tolerance for self-care routine  * Functional transfer/mobility training/DME recommendations for increased independence, safety, and fall prevention  * Patient/Family education to increase follow through with safety techniques and functional independence  *

## 2023-11-14 NOTE — PROGRESS NOTES
ED progress note    Was paged the patient was suffering from right lower finger numbness    I assessed the patient. She states that she is been having this right lower finger numbness since the time of injury, she does state that is slightly worse, however she began propping up her right arm. Patient does states she feels sensation first through fifth digit radial and ulnar aspects. With slight sensation of the radial finger. Patient is able to demonstrate AIN, PIN, median nerve, ulnar motor sensation intact. I loosened the splint around the patient's right arm, encourage propping up the patient's right arm and icing.   Patient will be ordered Hermelindo's pillow

## 2023-11-14 NOTE — PLAN OF CARE
Problem: Pain  Goal: Verbalizes/displays adequate comfort level or baseline comfort level  11/13/2023 2148 by Darrel Arnett RN  Outcome: Progressing     Problem: Safety - Adult  Goal: Free from fall injury  11/13/2023 2148 by Darrel Arnett RN  Outcome: Progressing

## 2023-11-15 ENCOUNTER — APPOINTMENT (OUTPATIENT)
Dept: GENERAL RADIOLOGY | Age: 83
DRG: 512 | End: 2023-11-15
Payer: MEDICARE

## 2023-11-15 ENCOUNTER — ANESTHESIA EVENT (OUTPATIENT)
Dept: OPERATING ROOM | Age: 83
DRG: 512 | End: 2023-11-15
Payer: MEDICARE

## 2023-11-15 ENCOUNTER — ANESTHESIA (OUTPATIENT)
Dept: OPERATING ROOM | Age: 83
DRG: 512 | End: 2023-11-15
Payer: MEDICARE

## 2023-11-15 LAB
INR PPP: 1.2
PARTIAL THROMBOPLASTIN TIME: 26.9 SEC (ref 24.5–35.1)
PROTHROMBIN TIME: 13.1 SEC (ref 9.3–12.4)

## 2023-11-15 PROCEDURE — 24666 OPTX RADIAL HEAD/NCK FX RPLC: CPT | Performed by: ORTHOPAEDIC SURGERY

## 2023-11-15 PROCEDURE — 1200000000 HC SEMI PRIVATE

## 2023-11-15 PROCEDURE — 3600000014 HC SURGERY LEVEL 4 ADDTL 15MIN: Performed by: ORTHOPAEDIC SURGERY

## 2023-11-15 PROCEDURE — 85730 THROMBOPLASTIN TIME PARTIAL: CPT

## 2023-11-15 PROCEDURE — 85610 PROTHROMBIN TIME: CPT

## 2023-11-15 PROCEDURE — 7100000000 HC PACU RECOVERY - FIRST 15 MIN: Performed by: ORTHOPAEDIC SURGERY

## 2023-11-15 PROCEDURE — C1713 ANCHOR/SCREW BN/BN,TIS/BN: HCPCS | Performed by: ORTHOPAEDIC SURGERY

## 2023-11-15 PROCEDURE — 7100000001 HC PACU RECOVERY - ADDTL 15 MIN: Performed by: ORTHOPAEDIC SURGERY

## 2023-11-15 PROCEDURE — 6360000002 HC RX W HCPCS

## 2023-11-15 PROCEDURE — 2580000003 HC RX 258: Performed by: FAMILY MEDICINE

## 2023-11-15 PROCEDURE — 2500000003 HC RX 250 WO HCPCS: Performed by: NURSE ANESTHETIST, CERTIFIED REGISTERED

## 2023-11-15 PROCEDURE — 3700000001 HC ADD 15 MINUTES (ANESTHESIA): Performed by: ORTHOPAEDIC SURGERY

## 2023-11-15 PROCEDURE — 6360000002 HC RX W HCPCS: Performed by: INTERNAL MEDICINE

## 2023-11-15 PROCEDURE — 2580000003 HC RX 258: Performed by: NURSE ANESTHETIST, CERTIFIED REGISTERED

## 2023-11-15 PROCEDURE — 6360000002 HC RX W HCPCS: Performed by: ORTHOPAEDIC SURGERY

## 2023-11-15 PROCEDURE — 94640 AIRWAY INHALATION TREATMENT: CPT

## 2023-11-15 PROCEDURE — 2580000003 HC RX 258

## 2023-11-15 PROCEDURE — 2720000010 HC SURG SUPPLY STERILE: Performed by: ORTHOPAEDIC SURGERY

## 2023-11-15 PROCEDURE — 6360000002 HC RX W HCPCS: Performed by: NURSE ANESTHETIST, CERTIFIED REGISTERED

## 2023-11-15 PROCEDURE — 0PSH04Z REPOSITION RIGHT RADIUS WITH INTERNAL FIXATION DEVICE, OPEN APPROACH: ICD-10-PCS | Performed by: ORTHOPAEDIC SURGERY

## 2023-11-15 PROCEDURE — 6370000000 HC RX 637 (ALT 250 FOR IP)

## 2023-11-15 PROCEDURE — 2500000003 HC RX 250 WO HCPCS: Performed by: ANESTHESIOLOGY

## 2023-11-15 PROCEDURE — 36415 COLL VENOUS BLD VENIPUNCTURE: CPT

## 2023-11-15 PROCEDURE — 24685 OPTX ULNAR FX PROX END W/FIX: CPT | Performed by: ORTHOPAEDIC SURGERY

## 2023-11-15 PROCEDURE — 73110 X-RAY EXAM OF WRIST: CPT

## 2023-11-15 PROCEDURE — 25609 OPTX DST RD XART FX/EP SEP3+: CPT | Performed by: ORTHOPAEDIC SURGERY

## 2023-11-15 PROCEDURE — 3600000004 HC SURGERY LEVEL 4 BASE: Performed by: ORTHOPAEDIC SURGERY

## 2023-11-15 PROCEDURE — 73070 X-RAY EXAM OF ELBOW: CPT

## 2023-11-15 PROCEDURE — C1776 JOINT DEVICE (IMPLANTABLE): HCPCS | Performed by: ORTHOPAEDIC SURGERY

## 2023-11-15 PROCEDURE — 3700000000 HC ANESTHESIA ATTENDED CARE: Performed by: ORTHOPAEDIC SURGERY

## 2023-11-15 PROCEDURE — 2700000000 HC OXYGEN THERAPY PER DAY

## 2023-11-15 PROCEDURE — 6360000002 HC RX W HCPCS: Performed by: FAMILY MEDICINE

## 2023-11-15 PROCEDURE — 2709999900 HC NON-CHARGEABLE SUPPLY: Performed by: ORTHOPAEDIC SURGERY

## 2023-11-15 PROCEDURE — 6370000000 HC RX 637 (ALT 250 FOR IP): Performed by: FAMILY MEDICINE

## 2023-11-15 PROCEDURE — 0PSK04Z REPOSITION RIGHT ULNA WITH INTERNAL FIXATION DEVICE, OPEN APPROACH: ICD-10-PCS | Performed by: ORTHOPAEDIC SURGERY

## 2023-11-15 DEVICE — IMPLANTABLE DEVICE
Type: IMPLANTABLE DEVICE | Site: ELBOW | Status: FUNCTIONAL
Brand: EVOLVE

## 2023-11-15 DEVICE — K-WIRE
Type: IMPLANTABLE DEVICE | Site: ELBOW | Status: FUNCTIONAL
Brand: MICRONAIL

## 2023-11-15 DEVICE — PLATE BNE W22XL54MM STD 6X3 H ST R DST RAD VOLAR S STL VAR: Type: IMPLANTABLE DEVICE | Site: WRIST | Status: FUNCTIONAL

## 2023-11-15 DEVICE — SCREW BNE L20MM DIA2.4MM DST RAD VOLAR S STL ST VAR ANG LOK: Type: IMPLANTABLE DEVICE | Site: WRIST | Status: FUNCTIONAL

## 2023-11-15 DEVICE — IMPLANTABLE DEVICE
Type: IMPLANTABLE DEVICE | Site: ELBOW | Status: FUNCTIONAL
Brand: ORTHOLOC 3DSI

## 2023-11-15 DEVICE — SCREW BNE L16MM DIA2.7MM CORT S STL ST T8 STARDRV RECESS: Type: IMPLANTABLE DEVICE | Site: WRIST | Status: FUNCTIONAL

## 2023-11-15 DEVICE — SCREW BNE L18MM DIA2.4MM DST RAD VOLAR S STL ST VAR ANG LOK: Type: IMPLANTABLE DEVICE | Site: WRIST | Status: FUNCTIONAL

## 2023-11-15 DEVICE — SCREW BNE L22MM DIA2.4MM DST RAD VOLAR S STL ST VAR ANG LOK: Type: IMPLANTABLE DEVICE | Site: WRIST | Status: FUNCTIONAL

## 2023-11-15 DEVICE — EPS DRILL BIT
Type: IMPLANTABLE DEVICE | Site: ELBOW | Status: FUNCTIONAL
Brand: EVOLVE

## 2023-11-15 DEVICE — IMPLANTABLE DEVICE
Type: IMPLANTABLE DEVICE | Site: ELBOW | Status: FUNCTIONAL
Brand: EVOLVE ORTHOLOC

## 2023-11-15 RX ORDER — ASPIRIN 81 MG/1
81 TABLET ORAL 2 TIMES DAILY
Status: DISCONTINUED | OUTPATIENT
Start: 2023-11-16 | End: 2023-11-16 | Stop reason: HOSPADM

## 2023-11-15 RX ORDER — OXYCODONE HYDROCHLORIDE AND ACETAMINOPHEN 5; 325 MG/1; MG/1
1 TABLET ORAL EVERY 6 HOURS PRN
Qty: 28 TABLET | Refills: 0 | Status: SHIPPED | OUTPATIENT
Start: 2023-11-15 | End: 2023-11-22

## 2023-11-15 RX ORDER — SODIUM CHLORIDE, SODIUM LACTATE, POTASSIUM CHLORIDE, CALCIUM CHLORIDE 600; 310; 30; 20 MG/100ML; MG/100ML; MG/100ML; MG/100ML
INJECTION, SOLUTION INTRAVENOUS CONTINUOUS PRN
Status: DISCONTINUED | OUTPATIENT
Start: 2023-11-15 | End: 2023-11-15 | Stop reason: SDUPTHER

## 2023-11-15 RX ORDER — TOBRAMYCIN 1.2 G/30ML
INJECTION, POWDER, LYOPHILIZED, FOR SOLUTION INTRAVENOUS PRN
Status: DISCONTINUED | OUTPATIENT
Start: 2023-11-15 | End: 2023-11-15 | Stop reason: ALTCHOICE

## 2023-11-15 RX ORDER — ASPIRIN 81 MG/1
81 TABLET ORAL 2 TIMES DAILY
Qty: 30 TABLET | Refills: 0 | Status: SHIPPED | OUTPATIENT
Start: 2023-11-15

## 2023-11-15 RX ORDER — SODIUM CHLORIDE 0.9 % (FLUSH) 0.9 %
5-40 SYRINGE (ML) INJECTION PRN
Status: DISCONTINUED | OUTPATIENT
Start: 2023-11-15 | End: 2023-11-15 | Stop reason: HOSPADM

## 2023-11-15 RX ORDER — HYDROMORPHONE HYDROCHLORIDE 1 MG/ML
0.25 INJECTION, SOLUTION INTRAMUSCULAR; INTRAVENOUS; SUBCUTANEOUS EVERY 5 MIN PRN
Status: DISCONTINUED | OUTPATIENT
Start: 2023-11-15 | End: 2023-11-15 | Stop reason: HOSPADM

## 2023-11-15 RX ORDER — LABETALOL HYDROCHLORIDE 5 MG/ML
INJECTION, SOLUTION INTRAVENOUS PRN
Status: DISCONTINUED | OUTPATIENT
Start: 2023-11-15 | End: 2023-11-15 | Stop reason: SDUPTHER

## 2023-11-15 RX ORDER — MIDAZOLAM HYDROCHLORIDE 1 MG/ML
INJECTION INTRAMUSCULAR; INTRAVENOUS PRN
Status: DISCONTINUED | OUTPATIENT
Start: 2023-11-15 | End: 2023-11-15 | Stop reason: SDUPTHER

## 2023-11-15 RX ORDER — DEXAMETHASONE SODIUM PHOSPHATE 10 MG/ML
INJECTION INTRAMUSCULAR; INTRAVENOUS PRN
Status: DISCONTINUED | OUTPATIENT
Start: 2023-11-15 | End: 2023-11-15 | Stop reason: SDUPTHER

## 2023-11-15 RX ORDER — FENTANYL CITRATE 50 UG/ML
INJECTION, SOLUTION INTRAMUSCULAR; INTRAVENOUS PRN
Status: DISCONTINUED | OUTPATIENT
Start: 2023-11-15 | End: 2023-11-15 | Stop reason: SDUPTHER

## 2023-11-15 RX ORDER — ONDANSETRON 2 MG/ML
INJECTION INTRAMUSCULAR; INTRAVENOUS PRN
Status: DISCONTINUED | OUTPATIENT
Start: 2023-11-15 | End: 2023-11-15 | Stop reason: SDUPTHER

## 2023-11-15 RX ORDER — ONDANSETRON 2 MG/ML
4 INJECTION INTRAMUSCULAR; INTRAVENOUS
Status: DISCONTINUED | OUTPATIENT
Start: 2023-11-15 | End: 2023-11-15 | Stop reason: HOSPADM

## 2023-11-15 RX ORDER — HYDROMORPHONE HYDROCHLORIDE 1 MG/ML
0.5 INJECTION, SOLUTION INTRAMUSCULAR; INTRAVENOUS; SUBCUTANEOUS EVERY 5 MIN PRN
Status: DISCONTINUED | OUTPATIENT
Start: 2023-11-15 | End: 2023-11-15 | Stop reason: HOSPADM

## 2023-11-15 RX ORDER — ROCURONIUM BROMIDE 10 MG/ML
INJECTION, SOLUTION INTRAVENOUS PRN
Status: DISCONTINUED | OUTPATIENT
Start: 2023-11-15 | End: 2023-11-15 | Stop reason: SDUPTHER

## 2023-11-15 RX ORDER — LIDOCAINE HYDROCHLORIDE 20 MG/ML
INJECTION, SOLUTION INTRAVENOUS PRN
Status: DISCONTINUED | OUTPATIENT
Start: 2023-11-15 | End: 2023-11-15 | Stop reason: SDUPTHER

## 2023-11-15 RX ORDER — SODIUM CHLORIDE 0.9 % (FLUSH) 0.9 %
5-40 SYRINGE (ML) INJECTION EVERY 12 HOURS SCHEDULED
Status: DISCONTINUED | OUTPATIENT
Start: 2023-11-15 | End: 2023-11-15 | Stop reason: HOSPADM

## 2023-11-15 RX ORDER — SODIUM CHLORIDE 9 MG/ML
INJECTION, SOLUTION INTRAVENOUS CONTINUOUS PRN
Status: DISCONTINUED | OUTPATIENT
Start: 2023-11-15 | End: 2023-11-15 | Stop reason: SDUPTHER

## 2023-11-15 RX ORDER — SODIUM CHLORIDE 9 MG/ML
INJECTION, SOLUTION INTRAVENOUS PRN
Status: DISCONTINUED | OUTPATIENT
Start: 2023-11-15 | End: 2023-11-15 | Stop reason: HOSPADM

## 2023-11-15 RX ORDER — PROPOFOL 10 MG/ML
INJECTION, EMULSION INTRAVENOUS PRN
Status: DISCONTINUED | OUTPATIENT
Start: 2023-11-15 | End: 2023-11-15 | Stop reason: SDUPTHER

## 2023-11-15 RX ADMIN — ONDANSETRON HYDROCHLORIDE 4 MG: 2 SOLUTION INTRAMUSCULAR; INTRAVENOUS at 13:07

## 2023-11-15 RX ADMIN — LIDOCAINE HYDROCHLORIDE 100 MG: 20 INJECTION, SOLUTION INTRAVENOUS at 09:39

## 2023-11-15 RX ADMIN — FENTANYL CITRATE 50 MCG: 0.05 INJECTION, SOLUTION INTRAMUSCULAR; INTRAVENOUS at 11:01

## 2023-11-15 RX ADMIN — CEFAZOLIN 2000 MG: 2 INJECTION, POWDER, FOR SOLUTION INTRAMUSCULAR; INTRAVENOUS at 10:00

## 2023-11-15 RX ADMIN — DEXAMETHASONE SODIUM PHOSPHATE 10 MG: 10 INJECTION INTRAMUSCULAR; INTRAVENOUS at 10:12

## 2023-11-15 RX ADMIN — FENTANYL CITRATE 50 MCG: 0.05 INJECTION, SOLUTION INTRAMUSCULAR; INTRAVENOUS at 09:39

## 2023-11-15 RX ADMIN — LABETALOL HYDROCHLORIDE 5 MG: 5 INJECTION INTRAVENOUS at 11:41

## 2023-11-15 RX ADMIN — METOPROLOL TARTRATE 25 MG: 25 TABLET, FILM COATED ORAL at 20:25

## 2023-11-15 RX ADMIN — SUGAMMADEX 200 MG: 100 INJECTION, SOLUTION INTRAVENOUS at 13:21

## 2023-11-15 RX ADMIN — SUGAMMADEX 100 MG: 100 INJECTION, SOLUTION INTRAVENOUS at 13:28

## 2023-11-15 RX ADMIN — OXYCODONE HYDROCHLORIDE 10 MG: 10 TABLET ORAL at 17:08

## 2023-11-15 RX ADMIN — SODIUM CHLORIDE: 9 INJECTION, SOLUTION INTRAVENOUS at 09:09

## 2023-11-15 RX ADMIN — FENTANYL CITRATE 50 MCG: 0.05 INJECTION, SOLUTION INTRAMUSCULAR; INTRAVENOUS at 10:09

## 2023-11-15 RX ADMIN — BUDESONIDE 500 MCG: 0.5 INHALANT RESPIRATORY (INHALATION) at 21:08

## 2023-11-15 RX ADMIN — METOPROLOL TARTRATE 25 MG: 25 TABLET, FILM COATED ORAL at 08:46

## 2023-11-15 RX ADMIN — PROPOFOL 100 MG: 10 INJECTION, EMULSION INTRAVENOUS at 09:39

## 2023-11-15 RX ADMIN — LABETALOL HYDROCHLORIDE 5 MG: 5 INJECTION INTRAVENOUS at 10:31

## 2023-11-15 RX ADMIN — ROCURONIUM BROMIDE 20 MG: 10 INJECTION, SOLUTION INTRAVENOUS at 12:17

## 2023-11-15 RX ADMIN — ROCURONIUM BROMIDE 20 MG: 10 INJECTION, SOLUTION INTRAVENOUS at 11:41

## 2023-11-15 RX ADMIN — ROCURONIUM BROMIDE 10 MG: 10 INJECTION, SOLUTION INTRAVENOUS at 10:40

## 2023-11-15 RX ADMIN — HYDROMORPHONE HYDROCHLORIDE 0.5 MG: 1 INJECTION, SOLUTION INTRAMUSCULAR; INTRAVENOUS; SUBCUTANEOUS at 14:30

## 2023-11-15 RX ADMIN — WATER 1000 MG: 1 INJECTION INTRAMUSCULAR; INTRAVENOUS; SUBCUTANEOUS at 18:26

## 2023-11-15 RX ADMIN — Medication 10 ML: at 20:33

## 2023-11-15 RX ADMIN — MORPHINE SULFATE 2 MG: 2 INJECTION, SOLUTION INTRAMUSCULAR; INTRAVENOUS at 20:26

## 2023-11-15 RX ADMIN — SODIUM CHLORIDE, POTASSIUM CHLORIDE, SODIUM LACTATE AND CALCIUM CHLORIDE: 600; 310; 30; 20 INJECTION, SOLUTION INTRAVENOUS at 12:47

## 2023-11-15 RX ADMIN — ROCURONIUM BROMIDE 10 MG: 10 INJECTION, SOLUTION INTRAVENOUS at 12:11

## 2023-11-15 RX ADMIN — MIDAZOLAM 1 MG: 1 INJECTION INTRAMUSCULAR; INTRAVENOUS at 09:36

## 2023-11-15 RX ADMIN — FENTANYL CITRATE 50 MCG: 0.05 INJECTION, SOLUTION INTRAMUSCULAR; INTRAVENOUS at 11:13

## 2023-11-15 RX ADMIN — MORPHINE SULFATE 2 MG: 2 INJECTION, SOLUTION INTRAMUSCULAR; INTRAVENOUS at 05:33

## 2023-11-15 RX ADMIN — OXYCODONE HYDROCHLORIDE 10 MG: 10 TABLET ORAL at 03:47

## 2023-11-15 RX ADMIN — FENTANYL CITRATE 50 MCG: 0.05 INJECTION, SOLUTION INTRAMUSCULAR; INTRAVENOUS at 10:21

## 2023-11-15 RX ADMIN — ROCURONIUM BROMIDE 40 MG: 10 INJECTION, SOLUTION INTRAVENOUS at 09:39

## 2023-11-15 ASSESSMENT — PAIN DESCRIPTION - PAIN TYPE
TYPE: ACUTE PAIN
TYPE: ACUTE PAIN
TYPE: SURGICAL PAIN
TYPE: SURGICAL PAIN

## 2023-11-15 ASSESSMENT — PAIN DESCRIPTION - DESCRIPTORS
DESCRIPTORS: ACHING;DISCOMFORT;SORE
DESCRIPTORS: BURNING;DISCOMFORT
DESCRIPTORS: ACHING;DISCOMFORT;SORE
DESCRIPTORS: ACHING;SORE;DULL

## 2023-11-15 ASSESSMENT — PAIN DESCRIPTION - LOCATION
LOCATION: ARM

## 2023-11-15 ASSESSMENT — PAIN SCALES - GENERAL
PAINLEVEL_OUTOF10: 7
PAINLEVEL_OUTOF10: 10
PAINLEVEL_OUTOF10: 10
PAINLEVEL_OUTOF10: 8
PAINLEVEL_OUTOF10: 6
PAINLEVEL_OUTOF10: 0
PAINLEVEL_OUTOF10: 7
PAINLEVEL_OUTOF10: 4
PAINLEVEL_OUTOF10: 9

## 2023-11-15 ASSESSMENT — PAIN DESCRIPTION - ONSET
ONSET: ON-GOING

## 2023-11-15 ASSESSMENT — PAIN DESCRIPTION - FREQUENCY
FREQUENCY: CONTINUOUS
FREQUENCY: INTERMITTENT
FREQUENCY: CONTINUOUS

## 2023-11-15 ASSESSMENT — PAIN - FUNCTIONAL ASSESSMENT
PAIN_FUNCTIONAL_ASSESSMENT: PREVENTS OR INTERFERES SOME ACTIVE ACTIVITIES AND ADLS

## 2023-11-15 ASSESSMENT — PAIN DESCRIPTION - ORIENTATION
ORIENTATION: LEFT
ORIENTATION: RIGHT

## 2023-11-15 NOTE — PROGRESS NOTES
Patient transferred to floor on bed in stable condition with ancillary staff. Patient transferred in stable condition on 4lnc.

## 2023-11-15 NOTE — OP NOTE
cleanup cut was completed, appropriate sounds were used to broach up to good fit. The calcar was planed and a trial radial head replacement was placed. It had appropriate stability after reduction and was then removed. We then made the final implant on the back table that was then placed and the lateral elbow joint was reduced showing appropriate stability. The ligamentous and fascial plane was then sutured with a number 5 fiber wire in a watertight fashion. Once completed, we then turned our attention to the olecranon. Periosteum about the olecranon and ulna was elevated both radially and ulnarly to expose the comminuted fracture site. There was a large butterfly fragment that was reduced using pointed reduction clamps. Once reduced, a radial to ulnar lag was used to affix the distal portion of the butterfly. The more simple pattern was then reduced with pointed reduction clamps. Next a [employer] olecranon plate was then placed and affixed proximally and distally with K-wires. We then confirmed appropriate position of the plate on bone using xray. Once this was confirmed, a shaft screw was placed about the shaft of the olecranon to affix the plate closer to bone, once confirmed another shaft screw was placed and then the proximal lockers were placed. The two proximal screws were placed under fluoroscopy as home run screws. We then placed multiple lockers in the metaphyseal region proximally and then placed another screw distally. Once all fixation was completed, xray's confirmed near anatomic alignment. The incision was then irrigated and tobramycin was placed in the wound. Tourniquet was released and bleeders were coagulated. Layered closure was then made and a sterile dressing was placed temporarily as we moved the patient supine. Once the olecranon/radial head was fixed, the patient was transferred to the supine position by multiple individuals.   A hand table was then placed and the arm was re-prepped and draped in standard sterile fashion so we could fix the distal radius fracture. Adelbert Stall was used to exsanguinate the limb and the tourniquet was inflated. A volar incision centered over the FCR tendon was made. The FCR tendon was identified and confirmed. The tendon sheath was opened. It was retracted radially. The interval between the brachioradialis and the pronator quadratus was utilized to get down to the pronator quadratus. The pronator quadratus was feathered off of the distal radius. Deep retractors were placed. Patient had a severely comminuted intra-articular fracture. A standard variable angle distal radius locking plate was applied. It was provisionally affixed with K-wires. The radial styloid was keyed into position showing a smooth improved joint surface. We then used a free to elevate the impacted joint surface and a k-wire was also placed to raft the joint surface. It was checked under fluoroscopy and showed to be in position. The plate was affixed to the shaft with 1 bicortical screw to suck the plate down to bone. This was confirmed under fluoroscopy. Once the plate was affixed proximally, all distal screws were drilled and locking screws were placed with good spread. These screws were checked under xray. We then placed two more shaft screws with good purchase. Final xray's were taken showing appropriate length and trajectory of the screws. The volar wound was copiously irrigated and closed in layers with 2-0 Monocryl and nylon sutures in the skin. It was sterilely dressed. The tourniquet was deflated and a long arm posterior splint with a wrist mold was made. Disposition: The patient was taken to PACU in stable condition. Once stable, she will be transferred to the floor. Orders have been provided to begin physical therapy, non-weight bearing to the Right upper extremity. Patient received a dose of ancef preoperatively.  We will continue this for 24 hours

## 2023-11-15 NOTE — ANESTHESIA POSTPROCEDURE EVALUATION
Department of Anesthesiology  Postprocedure Note    Patient: Jg León  MRN: [id number]  [facility] 1940  Date of evaluation: 11/15/2023      Procedure Summary     Date: 11/15/23 Room / Location: [facility] OR  / [facility]    Anesthesia Start: 0994 Anesthesia Stop: 1347    Procedure: Open Reduction Internal Fixation Right Distal Radius, Radial Head Replacement, Open Reduction Internal Fixation Proximal Ulna and Radial Neck (Right: Elbow) Diagnosis:       Type III open fracture of proximal end of radius, unspecified fracture morphology, unspecified laterality, initial encounter      (Type III open fracture of proximal end of radius, unspecified fracture morphology, unspecified laterality, initial encounter [S52.109C])    Surgeons: Arcadio Patel DO Responsible Provider: Ginger Gabriel MD    Anesthesia Type: general ASA Status: 3          Anesthesia Type: No value filed.     Gerard Phase I: Gerard Score: 8    Gerard Phase II:        Anesthesia Post Evaluation    Patient location during evaluation: PACU  Patient participation: complete - patient participated  Level of consciousness: awake  Pain score: 3  Airway patency: patent  Nausea & Vomiting: no nausea  Complications: no  Cardiovascular status: hemodynamically stable  Respiratory status: acceptable  Hydration status: stable  Multimodal analgesia pain management approach

## 2023-11-15 NOTE — ACP (ADVANCE CARE PLANNING)
Advance Care Planning     General Advance Care Planning (ACP) Conversation    Date of Conversation: 11/12/2023  Conducted with: Patient with Decision Making Capacity    Healthcare Decision Maker:  Mahin Milligan [phone]    Content/Action Overview:    Reviewed DNR/DNI and patient elects Full Code (Attempt Resuscitation)        Length of Voluntary ACP Conversation in minutes:      Mckay Clement RN

## 2023-11-15 NOTE — CARE COORDINATION
11/15. For surgery today. Pt is ambulatory. Will get PT/OT after surgery. Plan is home with daughter.  Michelle Royal RN

## 2023-11-15 NOTE — PROGRESS NOTES
Patient's fingers have good sensation, the fingers are pink and warm. Patient will not move fingers at this time.

## 2023-11-15 NOTE — PROGRESS NOTES
Patient in OR during rounds.      Electronically signed by LOLITA Chavarria CNP on 11/15/2023 at 12:00

## 2023-11-16 VITALS
WEIGHT: 162 LBS | RESPIRATION RATE: 17 BRPM | DIASTOLIC BLOOD PRESSURE: 69 MMHG | OXYGEN SATURATION: 96 % | SYSTOLIC BLOOD PRESSURE: 142 MMHG | TEMPERATURE: 98.4 F | HEART RATE: 71 BPM

## 2023-11-16 LAB
ALBUMIN SERPL-MCNC: 3.5 G/DL (ref 3.5–5.2)
ALP SERPL-CCNC: 58 U/L (ref 35–104)
ALT SERPL-CCNC: 13 U/L (ref 0–32)
ANION GAP SERPL CALCULATED.3IONS-SCNC: 11 MMOL/L (ref 7–16)
AST SERPL-CCNC: 21 U/L (ref 0–31)
BASOPHILS # BLD: 0.02 K/UL (ref 0–0.2)
BASOPHILS NFR BLD: 0 % (ref 0–2)
BILIRUB SERPL-MCNC: 0.7 MG/DL (ref 0–1.2)
BUN SERPL-MCNC: 15 MG/DL (ref 6–23)
CALCIUM SERPL-MCNC: 8.6 MG/DL (ref 8.6–10.2)
CHLORIDE SERPL-SCNC: 101 MMOL/L (ref 98–107)
CO2 SERPL-SCNC: 23 MMOL/L (ref 22–29)
CREAT SERPL-MCNC: 0.6 MG/DL (ref 0.5–1)
EKG ATRIAL RATE: 61 BPM
EKG P AXIS: 49 DEGREES
EKG P-R INTERVAL: 150 MS
EKG Q-T INTERVAL: 458 MS
EKG QRS DURATION: 124 MS
EKG QTC CALCULATION (BAZETT): 461 MS
EKG R AXIS: 18 DEGREES
EKG T AXIS: 21 DEGREES
EKG VENTRICULAR RATE: 61 BPM
EOSINOPHIL # BLD: 0 K/UL (ref 0.05–0.5)
EOSINOPHILS RELATIVE PERCENT: 0 % (ref 0–6)
ERYTHROCYTE [DISTWIDTH] IN BLOOD BY AUTOMATED COUNT: 14 % (ref 11.5–15)
GFR SERPL CREATININE-BSD FRML MDRD: >60 ML/MIN/1.73M2
GLUCOSE SERPL-MCNC: 158 MG/DL (ref 74–99)
HCT VFR BLD AUTO: 32.5 % (ref 34–48)
HGB BLD-MCNC: 10.3 G/DL (ref 11.5–15.5)
IMM GRANULOCYTES # BLD AUTO: 0.09 K/UL (ref 0–0.58)
IMM GRANULOCYTES NFR BLD: 1 % (ref 0–5)
LYMPHOCYTES NFR BLD: 2.05 K/UL (ref 1.5–4)
LYMPHOCYTES RELATIVE PERCENT: 14 % (ref 20–42)
MCH RBC QN AUTO: 27.6 PG (ref 26–35)
MCHC RBC AUTO-ENTMCNC: 31.7 G/DL (ref 32–34.5)
MCV RBC AUTO: 87.1 FL (ref 80–99.9)
MONOCYTES NFR BLD: 1.5 K/UL (ref 0.1–0.95)
MONOCYTES NFR BLD: 10 % (ref 2–12)
NEUTROPHILS NFR BLD: 75 % (ref 43–80)
NEUTS SEG NFR BLD: 11.2 K/UL (ref 1.8–7.3)
PLATELET # BLD AUTO: 220 K/UL (ref 130–450)
PMV BLD AUTO: 11.1 FL (ref 7–12)
POTASSIUM SERPL-SCNC: 4.5 MMOL/L (ref 3.5–5)
PROT SERPL-MCNC: 6.7 G/DL (ref 6.4–8.3)
RBC # BLD AUTO: 3.73 M/UL (ref 3.5–5.5)
SODIUM SERPL-SCNC: 135 MMOL/L (ref 132–146)
WBC OTHER # BLD: 14.9 K/UL (ref 4.5–11.5)

## 2023-11-16 PROCEDURE — 36415 COLL VENOUS BLD VENIPUNCTURE: CPT

## 2023-11-16 PROCEDURE — 6360000002 HC RX W HCPCS

## 2023-11-16 PROCEDURE — 97530 THERAPEUTIC ACTIVITIES: CPT

## 2023-11-16 PROCEDURE — 6370000000 HC RX 637 (ALT 250 FOR IP)

## 2023-11-16 PROCEDURE — 2580000003 HC RX 258

## 2023-11-16 PROCEDURE — 6370000000 HC RX 637 (ALT 250 FOR IP): Performed by: FAMILY MEDICINE

## 2023-11-16 PROCEDURE — 2580000003 HC RX 258: Performed by: FAMILY MEDICINE

## 2023-11-16 PROCEDURE — 97535 SELF CARE MNGMENT TRAINING: CPT

## 2023-11-16 PROCEDURE — 2700000000 HC OXYGEN THERAPY PER DAY

## 2023-11-16 PROCEDURE — 85025 COMPLETE CBC W/AUTO DIFF WBC: CPT

## 2023-11-16 PROCEDURE — 80053 COMPREHEN METABOLIC PANEL: CPT

## 2023-11-16 PROCEDURE — 6360000002 HC RX W HCPCS: Performed by: FAMILY MEDICINE

## 2023-11-16 RX ADMIN — ASPIRIN 81 MG: 81 TABLET, COATED ORAL at 08:55

## 2023-11-16 RX ADMIN — ALLOPURINOL 100 MG: 100 TABLET ORAL at 08:56

## 2023-11-16 RX ADMIN — PANTOPRAZOLE SODIUM 40 MG: 40 TABLET, DELAYED RELEASE ORAL at 08:55

## 2023-11-16 RX ADMIN — CITALOPRAM HYDROBROMIDE 20 MG: 20 TABLET ORAL at 08:55

## 2023-11-16 RX ADMIN — OXYCODONE HYDROCHLORIDE 10 MG: 10 TABLET ORAL at 06:10

## 2023-11-16 RX ADMIN — WATER 1000 MG: 1 INJECTION INTRAMUSCULAR; INTRAVENOUS; SUBCUTANEOUS at 01:59

## 2023-11-16 RX ADMIN — OXYCODONE HYDROCHLORIDE 10 MG: 10 TABLET ORAL at 11:48

## 2023-11-16 RX ADMIN — METOPROLOL TARTRATE 25 MG: 25 TABLET, FILM COATED ORAL at 08:59

## 2023-11-16 RX ADMIN — PRAVASTATIN SODIUM 40 MG: 20 TABLET ORAL at 08:55

## 2023-11-16 RX ADMIN — OXYCODONE HYDROCHLORIDE 10 MG: 10 TABLET ORAL at 01:03

## 2023-11-16 RX ADMIN — Medication 10 ML: at 08:56

## 2023-11-16 RX ADMIN — MORPHINE SULFATE 2 MG: 2 INJECTION, SOLUTION INTRAMUSCULAR; INTRAVENOUS at 02:32

## 2023-11-16 ASSESSMENT — PAIN SCALES - GENERAL
PAINLEVEL_OUTOF10: 4
PAINLEVEL_OUTOF10: 10
PAINLEVEL_OUTOF10: 9
PAINLEVEL_OUTOF10: 10
PAINLEVEL_OUTOF10: 7
PAINLEVEL_OUTOF10: 9
PAINLEVEL_OUTOF10: 10

## 2023-11-16 ASSESSMENT — PAIN DESCRIPTION - LOCATION
LOCATION: ARM

## 2023-11-16 ASSESSMENT — PAIN DESCRIPTION - DESCRIPTORS
DESCRIPTORS: ACHING;DISCOMFORT;SORE
DESCRIPTORS: ACHING;SORE;THROBBING
DESCRIPTORS: ACHING;DISCOMFORT;SORE
DESCRIPTORS: ACHING;CRUSHING;SORE

## 2023-11-16 ASSESSMENT — PAIN DESCRIPTION - FREQUENCY
FREQUENCY: INTERMITTENT
FREQUENCY: CONTINUOUS
FREQUENCY: CONTINUOUS

## 2023-11-16 ASSESSMENT — PAIN DESCRIPTION - ORIENTATION
ORIENTATION: RIGHT

## 2023-11-16 ASSESSMENT — PAIN DESCRIPTION - PAIN TYPE
TYPE: SURGICAL PAIN

## 2023-11-16 ASSESSMENT — PAIN - FUNCTIONAL ASSESSMENT
PAIN_FUNCTIONAL_ASSESSMENT: PREVENTS OR INTERFERES SOME ACTIVE ACTIVITIES AND ADLS
PAIN_FUNCTIONAL_ASSESSMENT: ACTIVITIES ARE NOT PREVENTED
PAIN_FUNCTIONAL_ASSESSMENT: PREVENTS OR INTERFERES SOME ACTIVE ACTIVITIES AND ADLS
PAIN_FUNCTIONAL_ASSESSMENT: PREVENTS OR INTERFERES SOME ACTIVE ACTIVITIES AND ADLS
PAIN_FUNCTIONAL_ASSESSMENT: ACTIVITIES ARE NOT PREVENTED

## 2023-11-16 ASSESSMENT — PAIN DESCRIPTION - ONSET
ONSET: ON-GOING

## 2023-11-16 NOTE — PROGRESS NOTES
Occupational Therapy  OT BEDSIDE TREATMENT NOTE    [facility] [address] [place] [address]      [id number]  Patient Name: Deann Moscoso  MRN: [id number]  : 1940  Room: [address] [place] [address]-     Referring Provider: LOLITA Cristina CNP  Specific Provider Orders/Date: OT evaluation and treat 23     Evaluating OT: Kirsten Swann, OTR/L #[id number]     Diagnosis: Closed fracture of right radius and ulna, initial encounter [S52.91XA, S52.201A]  Radius/ulna fracture, right, closed, with malunion, subsequent encounter [S52.91XP, S52.201P]       Surgery: scheduled for  operative fixation for R UE on Wednesday 11/15     Pertinent Medical History:  has no past medical history on file.       Precautions:  Fall Risk, NWB to RUE - keep elevated and iced     Assessment of current deficits   [x] Functional mobility             [x]ADLs           [] Strength                  []Cognition   [x] Functional transfers           [x] IADLs         [x] Safety Awareness   [x]Endurance   [] Fine Coordination              [x] Balance      [] Vision/perception   [x]Sensation     []Gross Motor Coordination  [x] ROM           [] Delirium                   [] Motor Control      OT PLAN OF CARE   OT POC based on physician orders, patient diagnosis and results of clinical assessment     Frequency/Duration   2-4 days/wk for 1 week PRN   Specific OT Treatment Interventions to include:   * Instruction/training on adapted ADL techniques and AE recommendations to increase functional independence within precautions       * Training on energy conservation strategies, correct breathing pattern and techniques to improve independence/tolerance for self-care routine  * Functional transfer/mobility training/DME recommendations for increased independence, safety, and fall prevention  * Patient/Family education to increase follow through with safety techniques and functional independence  * sit: mod I   Sit to supine: mod I    Functional Transfers Sit to stand mod A with RUE supported SBA- sit<->stand  Cuing for hand placement  Mod I    Functional Mobility Mod A with noted light headedness   SBA  Home distance no AD Mod I    Balance Sitting:     Static:  CGA    Dynamic:min A   Standing: mod A  Sitting:     Static:  Ind    Dynamic: SBA   Standing: SBA                                                                       Activity Tolerance Fair with noted O2 RA decreased to 86% returned to 2LO2   /52 Good- Good with ADL completion   Visual/  Perceptual Glasses: [place] Good-  Good-                                 Good with ADL completion        Comments: Upon arrival pt seated in bedside chair. Pt educated on techniques to increase independence and safety during ADL's, and functional transfers. Discussed home set up with pt, giving suggestions to increase safety at discharge. At end of session pt left seated in bedside chair, call light within reach. Pt has made fair+ progress towards set goals.      Continue with current plan of care    Treatment Time In: 9:25            Treatment Time Out: 9:50             Treatment Charges: Mins Units   Ther Ex  25724     Manual Therapy [address]     Thera Activities 36160 10 1   ADL/Home Mgt 94685 15 1   Neuro Re-ed 50855     Group Therapy      Orthotic manage/training  80157     Non-Billable Time     Total Timed Treatment 25 2     Ivet Pacheco

## 2023-11-16 NOTE — CARE COORDINATION
Patient is POD #1 Open Reduction Internal Fixation Right Distal Radius, Right Radial Head Replacement, Right ulna/olecranon variant open Reduction Internal Fixation Sling in place. PT  and OT [facility] 18/24 Met with patient at bedside to discuss possible therapy at home and she declined. Patient's daughter  or dil will transport when medically cleared to one of their homes. Per son.  Breanna Sy he also declined [address] and once at home will f/u with patient's physician  Patient contact number changer 2 [phone]

## 2023-11-16 NOTE — DISCHARGE SUMMARY
CREATININE 0.6   CALCIUM 8.6       XR CHEST PORTABLE    Result Date: 11/13/2023  EXAMINATION: ONE XRAY VIEW OF THE CHEST 11/13/2023 7:55 am COMPARISON: None. HISTORY: ORDERING SYSTEM PROVIDED HISTORY: pre op TECHNOLOGIST PROVIDED HISTORY: Reason for exam:->pre op What reading provider will be dictating this exam?->CRC FINDINGS: Cardiac size borderline enlarged without overt edema. No pneumothorax or pleural effusion. Degenerate changes of the spine     Cardiac size borderline enlarged without overt edema or effusion. No focal consolidation     CT RADIUS ULNA RIGHT WO CONTRAST    Result Date: 11/12/2023  EXAMINATION: CT OF THE RIGHT FOREARM WITHOUT CONTRAST 11/12/2023 6:39 pm TECHNIQUE: CT of the right forearm was performed without the administration of intravenous contrast.  Multiplanar reformatted images are provided for review. Automated exposure control, iterative reconstruction, and/or weight based adjustment of the mA/kV was utilized to reduce the radiation dose to as low as reasonably achievable. COMPARISON: Plain films of the forearm from today. HISTORY ORDERING SYSTEM PROVIDED HISTORY: preoperative planning TECHNOLOGIST PROVIDED HISTORY: Reason for exam:->preoperative planning What reading provider will be dictating this exam?->CRC FINDINGS: Examination is performed through a fiberglass cast which has been placed during the interval.  This does not degrade fine detail. Bones: There is an acute, comminuted, oblique fracture of the proximal ulna with approximately 15 degrees anterior angulation of the major distal fracture fragment. The fracture extends into the articular surface of the olecranon. There is also a tiny avulsion fracture of the tip of the coronoid process. There is an acute, comminuted, transverse fracture of the radial neck, with approximately 30% anterior displacement of the radial shaft. There is no sign of dislocation of the elbow. There is no sign of fracture of the distal humerus. bones and visualized proximal phalanxes. Degenerative changes seen in the head of the 1st metacarpal bone and also in the head of the 2nd metacarpal bone. There are soft tissue swelling in the dorsal and volar region of the right wrist.     Acute comminuted impacted displaced fracture of the distal right radius as above commented with positive ulnar variance. Cannot exclude discrete avulsions in the area of the right ulnar styloid process. Discharge Exam:    HEENT: NCAT,  PERRLA, No JVD  Heart:  RRR, no murmurs, gallops, or rubs. Lungs:  CTA bilaterally, no wheeze, rales or rhonchi  Abd: bowel sounds present, nontender, nondistended, no masses  Extrem:  No clubbing, cyanosis, or edema    Disposition: home     Patient Condition at Discharge: Stable     Patient Instructions:      Medication List        START taking these medications      oxyCODONE-acetaminophen 5-325 MG per tablet  Commonly known as: Percocet  Take 1 tablet by mouth every 6 hours as needed for Pain for up to 7 days. Intended supply: 7 days.  Take lowest dose possible to manage pain Max Daily Amount: 4 tablets            CHANGE how you take these medications      aspirin 81 MG EC tablet  Take 1 tablet by mouth in the morning and at bedtime  What changed: when to take this            CONTINUE taking these medications      acetaminophen 325 MG tablet  Commonly known as: TYLENOL     allopurinol 100 MG tablet  Commonly known as: ZYLOPRIM     citalopram 20 MG tablet  Commonly known as: CELEXA     fluticasone 110 MCG/ACT inhaler  Commonly known as: FLOVENT HFA     metoprolol tartrate 50 MG tablet  Commonly known as: LOPRESSOR     pantoprazole 40 MG tablet  Commonly known as: PROTONIX     pravastatin 40 MG tablet  Commonly known as: PRAVACHOL     Ventolin  (90 Base) MCG/ACT inhaler  Generic drug: albuterol sulfate HFA               Where to Get Your Medications        You can get these medications from any pharmacy    Bring a paper prescription

## 2023-11-16 NOTE — PROGRESS NOTES
Discharge instructions read and given to patient with all questions answered. Patient gave verbal understanding. IV taken out per protocol of being discharged.

## 2023-11-16 NOTE — PROGRESS NOTES
Physical Therapy  Treatment Note       Name: Derrick Chandra  : 1940  MRN: [id number]      Date of Service: 2023    Evaluating PT:  Ev Wong PT, DPT  [id number]    Room #:  8678/7065-Z  Diagnosis:  Closed fracture of right radius and ulna, initial encounter [S52.91XA, S52.201A]  Radius/ulna fracture, right, closed, with malunion, subsequent encounter [S52.91XP, S52.201P]  PMHx/PSHx:   has no past medical history on file. Procedure/Surgery:    Precautions:  NWB RUE, Falls,  ( 23)   Equipment Needs:  TBD    SUBJECTIVE:    Pt lives alone in a 1 story home with 5 stairs to enter and single rail. Bed is on first floor and bath is on first floor. Pt ambulated with no AD independently PTA. Equipment Owned:   [employer]   W/c    OBJECTIVE:   Initial Evaluation  Date: 23 Treatment  23 Short Term/ Long Term   Goals   AM-PAC 6 Clicks     Was pt agreeable to Eval/treatment? yes Yes     Does pt have pain? 9/10 R wrist No pain reported      Bed Mobility  Rolling: NT  Supine to sit: ModA  Sit to supine: NT  Scooting: ModA NT  Rolling: Independent  Supine to sit:  Independent  Sit to supine: Independent  Scooting: Independent     Transfers Sit to stand: [address]  Stand to sit: ModA  Stand pivot: ModA HHA Sit to stand SBA  Stand to sit SBA  Stand pivot without device with CG/SBA Sit to stand: Modified Independent    Stand to sit: Modified Independent    Stand pivot: Modified Independent     Ambulation    45 feet with ModA HHA  100 feet x2 without device with CG/ feet with Modified Independent   AD   Stair negotiation: ascended and descended  NT 4 steps with unilateral rails with min A  4 steps with single rail Modified Independent     ROM BUE:  Defer to OT  BLE:  WFl     Strength BUE:  Defer to OT  BLE:  4/5  Improve 1 MMT   Balance Sitting EOB:  SBA  Dynamic Standing:  ModA HHA  Sitting EOB:  Independent    Dynamic Standing:  Modified Independent           Vitals:  Pt on room air with activity ( SOB noted )  89-91 %           Patient education  Pt educated on diaphragmatic breathing     Patient response to education:   Pt verbalized understanding Pt demonstrated skill Pt requires further education in this area   x X Verbal cues  x     ASSESSMENT:    Conditions Requiring Skilled Therapeutic Intervention:    [x]Decreased strength     [x]Decreased ROM  [x]Decreased functional mobility  [x]Decreased balance   [x]Decreased endurance   []Decreased posture  []Decreased sensation  []Decreased coordination   []Decreased vision  [x]Decreased safety awareness   [x]Increased pain       Comments:  Pt sitting in chair  upon arrival and agreed to participate in therapy. Pt complete functional mobility as noted above.  standing balance improved this date requiring SBA . Pt still with some unsteadiness with gait. Some impulsivity noted with activity. Pt required hands on assistance with stair negotiation. Pt also reports she will be staying at daughter's home upon discharge. Pt returned to chair with call light in reach. Treatment:  Patient practiced and was instructed in the following treatment:    Functional transfers-Verbal instruction for technique to improve safety and balance. Gait training-Verbal instruction for slower aries to improve safety and balance. Stair negotiation - verbal instruction for technique and sequencing to improve safety and balance. Assistance required to complete task. Pt's/ family goals   1. Get better    Prognosis is good for reaching above PT goals. Patient and or family understand(s) diagnosis, prognosis, and plan of care.   yes    PHYSICAL THERAPY PLAN OF CARE:    PT POC is established based on physician order and patient diagnosis     Referring provider/PT Order:    11/12/23 2045  PT evaluation and treat  Start:  11/12/23 2045,   End:  11/12/23 2045,   ONE TIME,   Standing Count:  1 Occurrences,   R         Learn, LOLITA Mcrae - TIMMY     Diagnosis: Closed fracture of right radius and ulna, initial encounter [S52.91XA, S52.201A]  Radius/ulna fracture, right, closed, with malunion, subsequent encounter [S52.91XP, S52.201P]  Specific instructions for next treatment:  Functional mobility    Current Treatment Recommendations:     [x] Strengthening to improve independence with functional mobility   [x] ROM to improve independence with functional mobility   [x] Balance Training to improve static/dynamic balance and to reduce fall risk  [x] Endurance Training to improve activity tolerance during functional mobility   [x] Transfer Training to improve safety and independence with all functional transfers   [x] Gait Training to improve gait mechanics, endurance and assess need for appropriate assistive device  [x] Stair Training in preparation for safe discharge home and/or into the community   [x] Positioning to prevent skin breakdown and contractures  [x] Safety and Education Training   [x] Patient/Caregiver Education   [] HEP  [] Other     PT long term treatment goals are located in above grid    Frequency of treatments: 5-7x/week x 1-2 weeks.     Time in 0930  Time out 0955    Total Treatment Time  25 minutes     CPT codes:  [] Low Complexity PT evaluation 36206  [] Moderate Complexity PT evaluation 74094  [] High Complexity PT evaluation 31479  [] PT Re-evaluation 67414  [] Gait training 97815 0 minutes  [] Manual therapy 83168 0 minutes  [x] Therapeutic activities 69444 25 minutes  [] Therapeutic exercises 10574 0 minutes  [] Neuromuscular reeducation 83804 0 minutes     Arch Scale [id number]

## 2023-11-16 NOTE — PROGRESS NOTES
PT verbalized that she almost fell and hit her post op arm off the bed while she's going to the bathroom with NA. She complained of pain and itchiness on right arm.  Notified ortho resident

## 2023-11-16 NOTE — PLAN OF CARE
Problem: Discharge Planning  Goal: Discharge to home or other facility with appropriate resources  11/16/2023 0912 by Yvette Esquivel RN  Outcome: Progressing  11/15/2023 2106 by Patrizia Vegas RN  Outcome: Progressing     Problem: Pain  Goal: Verbalizes/displays adequate comfort level or baseline comfort level  11/16/2023 0912 by Yvette Esquivel RN  Outcome: Progressing  11/15/2023 2106 by Patrizia Vegas RN  Outcome: Progressing     Problem: Safety - Adult  Goal: Free from fall injury  11/16/2023 0912 by Yvette Esquivel RN  Outcome: Progressing  11/15/2023 2106 by Ptarizia Vegas RN  Outcome: Progressing     Problem: ABCDS Injury Assessment  Goal: Absence of physical injury  Outcome: Progressing     Problem: Skin/Tissue Integrity  Goal: Absence of new skin breakdown  Description: 1. Monitor for areas of redness and/or skin breakdown  2. Assess vascular access sites hourly  3. Every 4-6 hours minimum:  Change oxygen saturation probe site  4. Every 4-6 hours:  If on nasal continuous positive airway pressure, respiratory therapy assess nares and determine need for appliance change or resting period.   Outcome: Progressing

## 2023-11-24 DIAGNOSIS — S52.201P RADIUS/ULNA FRACTURE, RIGHT, CLOSED, WITH MALUNION, SUBSEQUENT ENCOUNTER: Primary | ICD-10-CM

## 2023-11-24 DIAGNOSIS — S52.91XP RADIUS/ULNA FRACTURE, RIGHT, CLOSED, WITH MALUNION, SUBSEQUENT ENCOUNTER: Primary | ICD-10-CM

## 2023-11-24 RX ORDER — OXYCODONE HYDROCHLORIDE AND ACETAMINOPHEN 5; 325 MG/1; MG/1
1 TABLET ORAL EVERY 6 HOURS PRN
Qty: 28 TABLET | Refills: 0 | Status: SHIPPED | OUTPATIENT
Start: 2023-11-24 | End: 2023-12-01

## 2023-11-24 NOTE — TELEPHONE ENCOUNTER
Percocet refilled. I called to confirm pharmacy: [facility] in [address] [place] [address]     Controlled Substance Monitoring:    Acute and Chronic Pain Monitoring:   RX Monitoring Periodic Controlled Substance Monitoring   11/24/2023   5:32 PM No signs of potential drug abuse or diversion identified.

## 2023-11-24 NOTE — TELEPHONE ENCOUNTER
Patients daughter called requesting refill for her mother of Percocet 5/325 mg.     DOS: 11/15/23      Future Appointments   Date Time Provider [address]   12/4/2023  1:15 PM SCHEDULE, [facility]

## 2023-11-27 ENCOUNTER — TELEPHONE (OUTPATIENT)
Dept: ORTHOPEDIC SURGERY | Age: 83
End: 2023-11-27

## 2023-11-27 NOTE — TELEPHONE ENCOUNTER
Pt called and stated  that her fingers have been swollen over the weekend. Pt stat she is able to feel and move fingers, no discoloration of the fingers, and has brisk cap refill. Educated patient to keep arm elevated above heart level, utilize ice therapy, and to continue tylenol and motrin rotation to assist with pain and swelling. Pt verbalized education back with full understanding. Encouraged to call with questions or concerns.

## 2023-11-29 NOTE — PROGRESS NOTES
Physician Progress Note      Mague Pressley  [facility] #:                  [id number]  :                       1940  ADMIT DATE:       2023 1:11 PM  [facility] DATE:        2023 5:44 PM  RESPONDING  PROVIDER #:        Francisco Driver MD          QUERY TEXT:    Dear Provider,    Pt admitted with Radius/ulnar fracture. Pt noted to have Radius/ulnar fracture   with Osteopenia. If possible, please document in progress notes and discharge   summary if you are evaluating and/or treating any of the following: The medical record reflects the following:  Risk Factors: Osteopenia. ....... fall after Right intraarticular Distal Radius   Fx  Clinical Indicators:  Orthopedic Surgery: Closed, Right intraarticular   Distal Radius Fx   XR Wrist: There osteopenia visualized bones structures. Treatment: PT/OT, Pain Control: IV and PO, XR wrist    Thank you,  Ro Kamara RN  Clinical Documentation Improvement  [phone]  Options provided:  -- Pathological Right intraarticular Distal Radius fracture due to osteopenia   following fall which would not usually break a normal, healthy bone  -- Traumatic Right intraarticular Distal Radius fracture  -- Other - I will add my own diagnosis  -- Disagree - Not applicable / Not valid  -- Disagree - Clinically unable to determine / Unknown  -- Refer to Clinical Documentation Reviewer    PROVIDER RESPONSE TEXT:    This patient has a traumatic Right intraarticular Distal Radius fracture.     Query created by: Ro Kamara on 2023 2:37 PM      Electronically signed by:  Francisco Driver MD 2023 10:39 AM

## 2023-12-01 DIAGNOSIS — S52.91XP RADIUS/ULNA FRACTURE, RIGHT, CLOSED, WITH MALUNION, SUBSEQUENT ENCOUNTER: Primary | ICD-10-CM

## 2023-12-01 DIAGNOSIS — S52.201P RADIUS/ULNA FRACTURE, RIGHT, CLOSED, WITH MALUNION, SUBSEQUENT ENCOUNTER: Primary | ICD-10-CM

## 2023-12-04 ENCOUNTER — OFFICE VISIT (OUTPATIENT)
Dept: ORTHOPEDIC SURGERY | Age: 83
End: 2023-12-04
Payer: MEDICARE

## 2023-12-04 ENCOUNTER — HOSPITAL ENCOUNTER (OUTPATIENT)
Dept: GENERAL RADIOLOGY | Age: 83
Discharge: HOME | End: 2023-12-06
Payer: MEDICARE

## 2023-12-04 DIAGNOSIS — S52.201P RADIUS/ULNA FRACTURE, RIGHT, CLOSED, WITH MALUNION, SUBSEQUENT ENCOUNTER: ICD-10-CM

## 2023-12-04 DIAGNOSIS — S52.91XP RADIUS/ULNA FRACTURE, RIGHT, CLOSED, WITH MALUNION, SUBSEQUENT ENCOUNTER: ICD-10-CM

## 2023-12-04 PROCEDURE — 99024 POSTOP FOLLOW-UP VISIT: CPT | Performed by: PHYSICIAN ASSISTANT

## 2023-12-04 PROCEDURE — 73070 X-RAY EXAM OF ELBOW: CPT

## 2023-12-04 PROCEDURE — L3809 WHFO W/O JOINTS PRE OTS: HCPCS

## 2023-12-04 PROCEDURE — 73110 X-RAY EXAM OF WRIST: CPT

## 2023-12-04 PROCEDURE — 99213 OFFICE O/P EST LOW 20 MIN: CPT

## 2023-12-04 RX ORDER — OXYCODONE HYDROCHLORIDE AND ACETAMINOPHEN 5; 325 MG/1; MG/1
1 TABLET ORAL EVERY 6 HOURS PRN
Qty: 28 TABLET | Refills: 0 | Status: SHIPPED | OUTPATIENT
Start: 2023-12-04 | End: 2023-12-11

## 2023-12-04 NOTE — PATIENT INSTRUCTIONS
Nonweightbearing right upper extremity. Sling for comfort, okay to start coming out of the sling. If Steri-Strips do not fall off right elbow and right wrist incisions in 7 days on their own, okay to remove. Referral for PT     Patient was placed into a right removable wrist brace. Okay to remove brace for hygiene and therapy. Follow-up in 4 weeks for reevaluation and x-rays. Call any questions or concerns.

## 2023-12-04 NOTE — PROGRESS NOTES
Chief Complaint   Patient presents with    Post-Op Check     Right olecranon ORIF, Right radial head arthroplasty, right distal radius ORIF 11/15/2023. Patient reports mild pain that has been lessening as the days pass. No numbness/tingling burning. OP:SURGEON: Dr. Genie Tobin DO  DATE OF PROCEDURE: 11-15-23  PROCEDURE: Open Reduction Internal Fixation Right Distal Radius, Right Radial Head Replacement, Right ulna/olecranon variant open Reduction Internal Fixation. POD: 3 weeks    Subjective:  Haydee Ledesma is following up from the above surgery. She is NWB on right upper extremity. She ambulates with no assistive device. Pain to extremity is mild and is taking prescribed pain medication, Percocet 5/325 mg. They denies numbness or tingling to the right upper extremity. Denies calf pain, chest pain, or shortness of breath. Patient is not participating in therapy at this time. She is doing well after surgery. Does complain of some stiffness and swelling in the right elbow and wrist.  She has been trying to elevate the right arm to help with swelling. Review of Systems -  All pertinent positives/negatives per HPI     Objective:    General: Alert and oriented X 3, normocephalic atraumatic, external ears and eye normal, sclera clear, no acute distress, respirations easy and unlabored with no audible wheezes, skin warm and dry, speech and dress appropriate for noted age, affect euthymic.     Extremity:  Right Upper Extremity  Skin is clean dry and intact   moderate edema noted diffusely in the right arm  2+ Radial pulse, fingers warm with BCR  Flex/extension intact to wrist, thumb and fingers   Finger opposition intact  Finger adduction/abduction intact  Finger crossover intact  Limited motion of the elbow due to stiffness and swelling  able to make concentric fist  Subjectively states sensation is intact to light touch over the Median Nerve, Ulnar Nerve, and Radial Nerve distribution  Incisions

## 2023-12-13 DIAGNOSIS — S52.91XP RADIUS/ULNA FRACTURE, RIGHT, CLOSED, WITH MALUNION, SUBSEQUENT ENCOUNTER: Primary | ICD-10-CM

## 2023-12-13 DIAGNOSIS — S52.201P RADIUS/ULNA FRACTURE, RIGHT, CLOSED, WITH MALUNION, SUBSEQUENT ENCOUNTER: Primary | ICD-10-CM

## 2023-12-13 RX ORDER — OXYCODONE HYDROCHLORIDE 5 MG/1
5 TABLET ORAL 2 TIMES DAILY PRN
Qty: 14 TABLET | Refills: 0 | Status: SHIPPED | OUTPATIENT
Start: 2023-12-13 | End: 2023-12-20

## 2023-12-13 NOTE — TELEPHONE ENCOUNTER
Oxycodone weaned to bid prn    Controlled Substance Monitoring:    Acute and Chronic Pain Monitoring:   RX Monitoring Periodic Controlled Substance Monitoring   12/13/2023   3:27 PM No signs of potential drug abuse or diversion identified.

## 2023-12-28 ENCOUNTER — TELEPHONE (OUTPATIENT)
Dept: ORTHOPEDIC SURGERY | Age: 83
End: 2023-12-28

## 2023-12-28 NOTE — TELEPHONE ENCOUNTER
Patient called office requesting refill of Oxycodone. Informed patient that she is 6wk postop and office will no longer cover pain medication. Educated patient she could use a tylenol and motrin rotation. Also offered patient a pain management referral which she declined at this time. Patient was upset with the response of dismissing pain medication at this time. Informed patient that the office must follow guidelines for controlled substances. Encouraged to call with questions or concerns.

## 2024-01-02 DIAGNOSIS — S52.201P RADIUS/ULNA FRACTURE, RIGHT, CLOSED, WITH MALUNION, SUBSEQUENT ENCOUNTER: Primary | ICD-10-CM

## 2024-01-02 DIAGNOSIS — S52.91XP RADIUS/ULNA FRACTURE, RIGHT, CLOSED, WITH MALUNION, SUBSEQUENT ENCOUNTER: Primary | ICD-10-CM

## 2024-01-04 ENCOUNTER — HOSPITAL ENCOUNTER (OUTPATIENT)
Dept: GENERAL RADIOLOGY | Age: 84
Discharge: HOME | End: 2024-01-06
Payer: MEDICARE

## 2024-01-04 ENCOUNTER — OFFICE VISIT (OUTPATIENT)
Dept: ORTHOPEDIC SURGERY | Age: 84
End: 2024-01-04
Payer: MEDICARE

## 2024-01-04 DIAGNOSIS — S52.201P RADIUS/ULNA FRACTURE, RIGHT, CLOSED, WITH MALUNION, SUBSEQUENT ENCOUNTER: Primary | ICD-10-CM

## 2024-01-04 DIAGNOSIS — S52.91XP RADIUS/ULNA FRACTURE, RIGHT, CLOSED, WITH MALUNION, SUBSEQUENT ENCOUNTER: ICD-10-CM

## 2024-01-04 DIAGNOSIS — S52.91XP RADIUS/ULNA FRACTURE, RIGHT, CLOSED, WITH MALUNION, SUBSEQUENT ENCOUNTER: Primary | ICD-10-CM

## 2024-01-04 DIAGNOSIS — S52.201P RADIUS/ULNA FRACTURE, RIGHT, CLOSED, WITH MALUNION, SUBSEQUENT ENCOUNTER: ICD-10-CM

## 2024-01-04 PROCEDURE — 99024 POSTOP FOLLOW-UP VISIT: CPT | Performed by: ORTHOPAEDIC SURGERY

## 2024-01-04 PROCEDURE — 73070 X-RAY EXAM OF ELBOW: CPT

## 2024-01-04 PROCEDURE — 73110 X-RAY EXAM OF WRIST: CPT

## 2024-01-04 PROCEDURE — 99213 OFFICE O/P EST LOW 20 MIN: CPT | Performed by: ORTHOPAEDIC SURGERY

## 2024-01-08 NOTE — PROGRESS NOTES
Chief Complaint   Patient presents with    Post-Op Check     6WK PO R Olecranon ORIF and R radial Head Artho. Pt ambulating w/o assist. Pt states 8/10 pain.         OP:SURGEON: Dr. Noah Palomo DO  DATE OF PROCEDURE: 11-15-23  PROCEDURE: Open Reduction Internal Fixation Right Distal Radius, Right Radial Head Replacement, Right ulna/olecranon variant open Reduction Internal Fixation.       Subjective:  Linda Lynne is following up from the above surgery. She is NWB on right upper extremity. She ambulates with no assistive device.  States her pain is very minimal at this point.  Does have some residual stiffness.  She is in physical therapy.  She has been using no immobilization.  They denies numbness or tingling to the right upper extremity. Denies calf pain, chest pain, or shortness of breath.  Still also has some swelling in the forearm and hand which seems to continue to improve.    Review of Systems -  All pertinent positives/negatives per HPI     Objective:    General: Alert and oriented X 3, normocephalic atraumatic, external ears and eye normal, sclera clear, no acute distress, respirations easy and unlabored with no audible wheezes, skin warm and dry, speech and dress appropriate for noted age, affect euthymic.    Extremity:  Right Upper Extremity  Surgical incisions clean dry intact, healing well, no erythema, no warmth, no induration or fluctuance  Mild residual edema noted throughout forearm and hand  Active elbow ROM  degrees, 10 degrees forearm supination near full pronation  2+ Radial pulse, fingers warm with BCR  Flex/extension intact to wrist, thumb and fingers   Finger opposition intact  Finger adduction/abduction intact  Finger crossover intact  able to make concentric fist  Subjectively states sensation is intact to light touch over the Median Nerve, Ulnar Nerve, and Radial Nerve distribution    There were no vitals taken for this visit.    XR:   3 views right elbow demonstrate s/p ORIF

## 2024-02-09 DIAGNOSIS — S52.201P RADIUS/ULNA FRACTURE, RIGHT, CLOSED, WITH MALUNION, SUBSEQUENT ENCOUNTER: Primary | ICD-10-CM

## 2024-02-09 DIAGNOSIS — S52.91XP RADIUS/ULNA FRACTURE, RIGHT, CLOSED, WITH MALUNION, SUBSEQUENT ENCOUNTER: Primary | ICD-10-CM

## 2024-02-15 ENCOUNTER — HOSPITAL ENCOUNTER (OUTPATIENT)
Dept: GENERAL RADIOLOGY | Age: 84
Discharge: HOME | End: 2024-02-17
Payer: MEDICARE

## 2024-02-15 ENCOUNTER — OFFICE VISIT (OUTPATIENT)
Dept: ORTHOPEDIC SURGERY | Age: 84
End: 2024-02-15
Payer: MEDICARE

## 2024-02-15 DIAGNOSIS — S52.571G OTHER CLOSED INTRA-ARTICULAR FRACTURE OF DISTAL END OF RIGHT RADIUS WITH DELAYED HEALING, SUBSEQUENT ENCOUNTER: Primary | ICD-10-CM

## 2024-02-15 DIAGNOSIS — S52.91XP RADIUS/ULNA FRACTURE, RIGHT, CLOSED, WITH MALUNION, SUBSEQUENT ENCOUNTER: ICD-10-CM

## 2024-02-15 DIAGNOSIS — S52.021D CLOSED FRACTURE OF OLECRANON PROCESS OF RIGHT ULNA WITH ROUTINE HEALING: ICD-10-CM

## 2024-02-15 DIAGNOSIS — S52.121D CLOSED DISPLACED FRACTURE OF HEAD OF RIGHT RADIUS WITH ROUTINE HEALING, SUBSEQUENT ENCOUNTER: ICD-10-CM

## 2024-02-15 DIAGNOSIS — S52.201P RADIUS/ULNA FRACTURE, RIGHT, CLOSED, WITH MALUNION, SUBSEQUENT ENCOUNTER: ICD-10-CM

## 2024-02-15 PROBLEM — S52.501G: Status: ACTIVE | Noted: 2024-02-15

## 2024-02-15 PROBLEM — S52.121A CLOSED DISPLACED FRACTURE OF HEAD OF RIGHT RADIUS: Status: ACTIVE | Noted: 2024-02-15

## 2024-02-15 PROCEDURE — 99024 POSTOP FOLLOW-UP VISIT: CPT | Performed by: ORTHOPAEDIC SURGERY

## 2024-02-15 PROCEDURE — 73110 X-RAY EXAM OF WRIST: CPT

## 2024-02-15 PROCEDURE — 73080 X-RAY EXAM OF ELBOW: CPT

## 2024-02-15 PROCEDURE — 99212 OFFICE O/P EST SF 10 MIN: CPT

## 2024-02-15 NOTE — PROGRESS NOTES
Chief Complaint   Patient presents with    Post-Op Check     12 week po RT olecranon ORIF radial head athroplasty 11/23.  Has issues with ROM therapy is awaiting to see if she is to continue.          OP:SURGEON: Dr. Noah Palomo DO  DATE OF PROCEDURE: 11-15-23  PROCEDURE: Open Reduction Internal Fixation Right Distal Radius, Right Radial Head Replacement, Right ulna/olecranon variant open Reduction Internal Fixation.       Subjective:  Linda Lynne is following up from the above surgery. She is WB on right upper extremity. States her pain is very minimal at this point.  Does have some residual stiffness.  She is in physical therapy.  She has been using no immobilization.  They denies numbness or tingling to the right upper extremity. Denies calf pain, chest pain, or shortness of breath.  Still also has some swelling in the forearm and hand which seems to continue to improve.    Review of Systems -  All pertinent positives/negatives per HPI     Objective:    General: Alert and oriented X 3, normocephalic atraumatic, external ears and eye normal, sclera clear, no acute distress, respirations easy and unlabored with no audible wheezes, skin warm and dry, speech and dress appropriate for noted age, affect euthymic.    Extremity:  Right Upper Extremity  Surgical incisions clean dry intact, healing well, no erythema, no warmth, no induration or fluctuance  No edema noted throughout forearm and hand, demonstrates good active wrist flexion extension without pain or crepitus  No tenderness to palpation of distal radius hardware, palpable deep suture over lateral elbow, nontender  Active elbow ROM 5-130 degrees, 10 degrees forearm supination near full pronation  2+ Radial pulse, fingers warm with BCR  Flex/extension intact to wrist, thumb and fingers   Finger opposition intact  Finger adduction/abduction intact  Finger crossover intact  able to make concentric fist  Subjectively states sensation is intact to light touch

## 2024-03-26 DIAGNOSIS — S52.501G CLOSED FRACTURE OF DISTAL END OF RIGHT RADIUS WITH DELAYED HEALING, UNSPECIFIED FRACTURE MORPHOLOGY, SUBSEQUENT ENCOUNTER: Primary | ICD-10-CM

## 2024-03-26 DIAGNOSIS — S52.021D CLOSED FRACTURE OF OLECRANON PROCESS OF RIGHT ULNA WITH ROUTINE HEALING: ICD-10-CM

## 2024-03-28 ENCOUNTER — HOSPITAL ENCOUNTER (OUTPATIENT)
Dept: GENERAL RADIOLOGY | Age: 84
Discharge: HOME | End: 2024-03-30
Payer: MEDICARE

## 2024-03-28 ENCOUNTER — OFFICE VISIT (OUTPATIENT)
Dept: ORTHOPEDIC SURGERY | Age: 84
End: 2024-03-28
Payer: MEDICARE

## 2024-03-28 VITALS — WEIGHT: 162.04 LBS

## 2024-03-28 DIAGNOSIS — S52.021D CLOSED FRACTURE OF OLECRANON PROCESS OF RIGHT ULNA WITH ROUTINE HEALING: ICD-10-CM

## 2024-03-28 DIAGNOSIS — S52.571G OTHER CLOSED INTRA-ARTICULAR FRACTURE OF DISTAL END OF RIGHT RADIUS WITH DELAYED HEALING, SUBSEQUENT ENCOUNTER: Primary | ICD-10-CM

## 2024-03-28 DIAGNOSIS — S52.501G CLOSED FRACTURE OF DISTAL END OF RIGHT RADIUS WITH DELAYED HEALING, UNSPECIFIED FRACTURE MORPHOLOGY, SUBSEQUENT ENCOUNTER: ICD-10-CM

## 2024-03-28 DIAGNOSIS — S52.121D CLOSED DISPLACED FRACTURE OF HEAD OF RIGHT RADIUS WITH ROUTINE HEALING, SUBSEQUENT ENCOUNTER: ICD-10-CM

## 2024-03-28 PROCEDURE — 73070 X-RAY EXAM OF ELBOW: CPT

## 2024-03-28 PROCEDURE — 99212 OFFICE O/P EST SF 10 MIN: CPT

## 2024-03-28 PROCEDURE — 73110 X-RAY EXAM OF WRIST: CPT

## 2024-03-28 PROCEDURE — 1123F ACP DISCUSS/DSCN MKR DOCD: CPT | Performed by: PHYSICIAN ASSISTANT

## 2024-03-28 PROCEDURE — 99213 OFFICE O/P EST LOW 20 MIN: CPT | Performed by: PHYSICIAN ASSISTANT

## 2024-03-28 RX ORDER — FLUTICASONE FUROATE 100 UG/1
POWDER RESPIRATORY (INHALATION)
COMMUNITY
Start: 2024-03-23

## 2024-03-28 NOTE — PROGRESS NOTES
Chief Complaint   Patient presents with    Follow-up     8 wk po R olecranon ORIF, R radial head arthroplasty, R distal radius ORIF versus dorsal spanning bridge plate, DOS: 11/15/2023; [facility]. Patient states pain lvl 2             OP:SURGEON: Dr. Noah Palomo DO  DATE OF PROCEDURE: 11-15-23  PROCEDURE: Open Reduction Internal Fixation Right Distal Radius, Right Radial Head Replacement, Right ulna/olecranon variant open Reduction Internal Fixation.    Subjective:  Linda Lynne is following up from the above surgery.  She has been weightbearing as tolerated to the right upper extremity.  She has minimal discomfort in the wrist or elbow.  States that she does have increased discomfort when resting her elbow on a hard surface due to hardware irritation.  Continues to work on range of motion and strengthening.    Review of Systems -  all pertinent positives and negatives in HPI.      Objective:    General: Alert and oriented X 3, normocephalic atraumatic, external ears and eye normal, sclera clear, no acute distress, respirations easy and unlabored with no audible wheezes, skin warm and dry, speech and dress appropriate for noted age, affect euthymic.    Extremity:  Right Upper Extremity  Skin is clean dry and intact  Radial pulse palpable, fingers warm with BCR  Flex/extension intact to wrist, thumb and fingers  Finger opposition intact  Finger adduction/abduction intact  Finger crossover intact  Subjectively states sensation intact to radial/medial/ulnar distribution  Incisions are healed  Nontender throughout the elbow and wrist to palpation  AROM of the wrist without discomfort, wrist flexion to 60, extension to 20  AROM elbow no discomfort, -5 to 135    XR:   3 views of R elbow demonstrating no discernable fracture lucency. Hardware and radial head arthroplasty remain intact without interval displacement, loosening, or failure. No significant change in alignment. No acute fractures or dislocations or any other

## 2024-05-30 ENCOUNTER — HOSPITAL ENCOUNTER (OUTPATIENT)
Dept: GENERAL RADIOLOGY | Age: 84
Discharge: HOME | End: 2024-06-01
Payer: MEDICARE

## 2024-05-30 ENCOUNTER — OFFICE VISIT (OUTPATIENT)
Dept: ORTHOPEDIC SURGERY | Age: 84
End: 2024-05-30
Payer: MEDICARE

## 2024-05-30 DIAGNOSIS — S52.021D CLOSED FRACTURE OF OLECRANON PROCESS OF RIGHT ULNA WITH ROUTINE HEALING: ICD-10-CM

## 2024-05-30 DIAGNOSIS — S52.121D CLOSED DISPLACED FRACTURE OF HEAD OF RIGHT RADIUS WITH ROUTINE HEALING, SUBSEQUENT ENCOUNTER: ICD-10-CM

## 2024-05-30 DIAGNOSIS — S52.021D CLOSED FRACTURE OF OLECRANON PROCESS OF RIGHT ULNA WITH ROUTINE HEALING: Primary | ICD-10-CM

## 2024-05-30 DIAGNOSIS — S52.571G OTHER CLOSED INTRA-ARTICULAR FRACTURE OF DISTAL END OF RIGHT RADIUS WITH DELAYED HEALING, SUBSEQUENT ENCOUNTER: ICD-10-CM

## 2024-05-30 PROCEDURE — 99213 OFFICE O/P EST LOW 20 MIN: CPT

## 2024-05-30 PROCEDURE — 73080 X-RAY EXAM OF ELBOW: CPT

## 2024-05-30 PROCEDURE — 1123F ACP DISCUSS/DSCN MKR DOCD: CPT

## 2024-05-30 PROCEDURE — 99212 OFFICE O/P EST SF 10 MIN: CPT

## 2024-05-30 PROCEDURE — 73110 X-RAY EXAM OF WRIST: CPT

## 2024-05-30 RX ORDER — VALSARTAN 80 MG/1
80 TABLET ORAL DAILY
COMMUNITY

## 2024-05-30 RX ORDER — MOMETASONE FUROATE 100 UG/1
1 AEROSOL RESPIRATORY (INHALATION) DAILY
COMMUNITY
Start: 2024-05-02

## 2024-05-30 RX ORDER — FLUTICASONE PROPIONATE 50 MCG
2 SPRAY, SUSPENSION (ML) NASAL DAILY
COMMUNITY
Start: 2024-04-30

## 2024-05-30 NOTE — PATIENT INSTRUCTIONS
Recommend continued aggressive therapy for an additional 3 times a week for 6 weeks  Focus on right forearm ROM, specifically improving supination.  Can continue to work on elbow ROM and forearm pronation as well.  Patient is weightbearing as tolerated.       Call office with any questions or concerns.

## 2024-05-30 NOTE — PROGRESS NOTES
Chief Complaint   Patient presents with    Follow-up     Pt f/u from R olecranon ORIF, R radial head arthoplasty, and R distal radius ORIF DOS 11/2023. Pt ambulating w/o assist. Pt states no pain.        OP:SURGEON: Dr. Noah Palomo DO  DATE OF PROCEDURE: 11-15-23  PROCEDURE: Open Reduction Internal Fixation Right Distal Radius, Right Radial Head Replacement, Right ulna/olecranon variant open Reduction Internal Fixation.    Subjective:  Linda Lynne is approximately 6 months follow-up from the above surgery. Patient doing very well, has no pain or discomfort. Patient does feel her ROM could be better in her wrist and while supinating forearm. Patient would like to extend PT to get better ROM. Patient with no other orthopedic complaints. Denies calf pain, CP, SOB, fever, chills, myalgias.    Review of Systems -  all pertinent positives and negatives in HPI.      Objective:    General: Alert and oriented X 3, normocephalic atraumatic, external ears and eye normal, sclera clear, no acute distress, respirations easy and unlabored with no audible wheezes, skin warm and dry, speech and dress appropriate for noted age, affect euthymic.    Extremity:  Right Upper Extremity  Skin is clean dry and intact  Radial pulse palpable, fingers warm with BCR  Flex/extension intact to wrist, thumb and fingers  Finger opposition intact  Finger adduction/abduction intact  Finger crossover intact  Subjectively states sensation intact to radial/medial/ulnar distribution  Incisions are healed  Nontender throughout the elbow and wrist to palpation  AROM of the wrist without discomfort, wrist flexion to 60, extension to 20  AROM elbow no discomfort, -5 to 135      XR:   3 views of right wrist demonstrating healed distal radius fracture. Hardware remains intact without interval displacement, loosening, or failure. No significant change in alignment. No acute fractures or dislocations or any other osseus abnormality identified.    3 views of

## 2024-12-30 ENCOUNTER — TRANSCRIBE ORDERS (OUTPATIENT)
Dept: ADMINISTRATIVE | Age: 84
End: 2024-12-30

## 2024-12-30 DIAGNOSIS — R10.84 ABDOMINAL PAIN, GENERALIZED: Primary | ICD-10-CM

## 2025-01-29 ENCOUNTER — HOSPITAL ENCOUNTER (OUTPATIENT)
Dept: ULTRASOUND IMAGING | Age: 85
Discharge: HOME | End: 2025-01-29
Attending: INTERNAL MEDICINE
Payer: MEDICARE

## 2025-01-29 DIAGNOSIS — R10.84 ABDOMINAL PAIN, GENERALIZED: ICD-10-CM

## 2025-01-29 PROCEDURE — 76705 ECHO EXAM OF ABDOMEN: CPT

## 2025-02-24 NOTE — PLAN OF CARE
DARIA patient. Patient verbalized understanding and appreciation Patient has been encouraged to take a half tablet Eliquis-BID if patient starts bleeding again. Patient states she will not take it again.        Problem: Discharge Planning  Goal: Discharge to home or other facility with appropriate resources  Outcome: Progressing     Problem: Pain  Goal: Verbalizes/displays adequate comfort level or baseline comfort level  Outcome: Progressing     Problem: Safety - Adult  Goal: Free from fall injury  Outcome: Progressing

## (undated) PROCEDURE — 0PSH04Z REPOSITION RIGHT RADIUS WITH INTERNAL FIXATION DEVICE, OPEN APPROACH: ICD-10-PCS

## (undated) DEVICE — SCREW BNE L20MM DIA2.7MM CORT S STL ST T8 STARDRV RECESS
Type: IMPLANTABLE DEVICE | Site: WRIST | Status: NON-FUNCTIONAL
Removed: 2023-11-15

## (undated) DEVICE — HANDLE LT FOR NLC C SECT RM ST/5

## (undated) DEVICE — BIT DRL L110MM DIA1.8MM QUIK CPL CALIB W/O STP REUSE

## (undated) DEVICE — ELECTRODE PT RET AD L9FT HI MOIST COND ADH HYDRGEL CORDED

## (undated) DEVICE — PENCIL CAUTERY HAND ST DISP 15FT

## (undated) DEVICE — GLOVE ORTHO 8   MSG9480

## (undated) DEVICE — EPS DRILL BIT: Brand: EVOLVE

## (undated) DEVICE — 4-PORT MANIFOLD: Brand: NEPTUNE 2

## (undated) DEVICE — DRAPE,HAND,STERILE: Brand: MEDLINE

## (undated) DEVICE — DRAPE EQUIP CARM 72X42 IN RUBBER BND CLP

## (undated) DEVICE — SHEET,DRAPE,53X77,STERILE: Brand: MEDLINE

## (undated) DEVICE — UPPER EXTREMITY: Brand: MEDLINE INDUSTRIES, INC.

## (undated) DEVICE — SLING ARM L L165IN D75IN WHT POLY MESH ENVELOP MTL SIDE

## (undated) DEVICE — POLY U-DRAPE: Brand: CONVERTORS

## (undated) DEVICE — GLOVE ORANGE PI 8   MSG9080

## (undated) DEVICE — SUCTION TUBE

## (undated) DEVICE — SCREW BNE L24MM DIA2.4MM DST RAD VOLAR S STL ST VAR ANG LOK
Type: IMPLANTABLE DEVICE | Site: WRIST | Status: NON-FUNCTIONAL
Removed: 2023-11-15

## (undated) DEVICE — ZIMMER® STERILE DISPOSABLE TOURNIQUET CUFF WITH PROTECTIVE SLEEVE AND PLC, DUAL PORT, SINGLE BLADDER, 18 IN. (46 CM)

## (undated) DEVICE — 3M™ IOBAN™ 2 ANTIMICROBIAL INCISE DRAPE 6640EZ: Brand: IOBAN™ 2

## (undated) DEVICE — SCREW BNE L26MM DIA2.4MM CORT S STL ST T8 STARDRV RECESS
Type: IMPLANTABLE DEVICE | Site: WRIST | Status: NON-FUNCTIONAL
Removed: 2023-11-15

## (undated) DEVICE — BIT DRL L100MM DIA2MM ST QUIK CPL NONRADIOPAQUE W/O STP

## (undated) DEVICE — Device